# Patient Record
Sex: MALE | Race: WHITE | NOT HISPANIC OR LATINO | URBAN - METROPOLITAN AREA
[De-identification: names, ages, dates, MRNs, and addresses within clinical notes are randomized per-mention and may not be internally consistent; named-entity substitution may affect disease eponyms.]

---

## 2022-11-17 ENCOUNTER — TELEPHONE (OUTPATIENT)
Dept: ENDOCRINOLOGY | Facility: CLINIC | Age: 52
End: 2022-11-17
Payer: COMMERCIAL

## 2022-11-17 NOTE — TELEPHONE ENCOUNTER
Called patient to inform him  of his appointment on Jan 30 and also to inform him that he will have to go to his PCP or Urgent to get him some meds due to the fact we can't give medication to new Patients

## 2022-11-17 NOTE — TELEPHONE ENCOUNTER
----- Message from Rona Peterson MA sent at 11/17/2022  3:52 PM CST -----  Regarding: RE: New Patient  Contact: Patient  Patient would have to contact PCP or go to urgent care we can not give meds to new patients and they are on the waiting list to get in sooner   ----- Message -----  From: Astrid Anderson MA  Sent: 11/17/2022   3:45 PM CST  To: Rona Peterson MA  Subject: RE: New Patient                                  Patient agreed to  this appointment , but patient is out of meds . How do I go about him a refill   ----- Message -----  From: Rona Peterson MA  Sent: 11/17/2022   3:25 PM CST  To: Astrid Anderson MA  Subject: RE: New Patient                                  The only apt is at the [facility] patient is scheduled just let them know thanks   ----- Message -----  From: Astrid Anderson MA  Sent: 11/17/2022  11:41 AM CST  To: Rona Peterson MA  Subject: FW: New Patient                                  Please Help !  ----- Message -----  From: Ame Bates  Sent: 11/17/2022  11:21 AM CST  To: , #  Subject: New Patient                                      Patient just moved to [facility] from [place]  Patient was previously diagnosed with thyroid cancer and would like to be seen by a physician as soon as possible   Patient stated he has about 30 days worth of medication left and would like to be seen prior to needing a refill   Attempted to schedule no appts generated   Patient does have Medical Insurance was unable to add to chart  Please Assist     [employer] insurance Member ID [id number]    Patient can be reached at [phone]

## 2023-01-30 ENCOUNTER — OFFICE VISIT (OUTPATIENT)
Dept: ENDOCRINOLOGY | Facility: CLINIC | Age: 53
End: 2023-01-30
Payer: COMMERCIAL

## 2023-01-30 VITALS
TEMPERATURE: 98 F | HEART RATE: 65 BPM | DIASTOLIC BLOOD PRESSURE: 86 MMHG | SYSTOLIC BLOOD PRESSURE: 132 MMHG | WEIGHT: 180 LBS

## 2023-01-30 DIAGNOSIS — Z85.850 HX OF PAPILLARY THYROID CARCINOMA: ICD-10-CM

## 2023-01-30 DIAGNOSIS — E89.0 POSTOPERATIVE HYPOTHYROIDISM: Primary | ICD-10-CM

## 2023-01-30 PROCEDURE — 99204 PR OFFICE/OUTPT VISIT, NEW, LEVL IV, 45-59 MIN: ICD-10-PCS | Mod: S$GLB,,, | Performed by: HOSPITALIST

## 2023-01-30 PROCEDURE — 99999 PR PBB SHADOW E&M-EST. PATIENT-LVL III: CPT | Mod: PBBFAC,,, | Performed by: HOSPITALIST

## 2023-01-30 PROCEDURE — 99204 OFFICE O/P NEW MOD 45 MIN: CPT | Mod: S$GLB,,, | Performed by: HOSPITALIST

## 2023-01-30 PROCEDURE — 99999 PR PBB SHADOW E&M-EST. PATIENT-LVL III: ICD-10-PCS | Mod: PBBFAC,,, | Performed by: HOSPITALIST

## 2023-01-30 RX ORDER — LEVOTHYROXINE SODIUM 75 UG/1
75 TABLET ORAL EVERY MORNING
COMMUNITY
Start: 2023-01-06 | End: 2023-01-30

## 2023-01-30 RX ORDER — PROPRANOLOL HYDROCHLORIDE 40 MG/1
40 TABLET ORAL 2 TIMES DAILY
COMMUNITY
Start: 2023-01-06 | End: 2023-11-09

## 2023-01-30 RX ORDER — LEVOTHYROXINE SODIUM 100 UG/1
100 TABLET ORAL EVERY MORNING
COMMUNITY
Start: 2023-01-06 | End: 2023-01-30

## 2023-01-30 RX ORDER — LEVOTHYROXINE SODIUM 175 UG/1
175 TABLET ORAL
Qty: 90 TABLET | Refills: 3 | Status: SHIPPED | OUTPATIENT
Start: 2023-01-30 | End: 2023-03-22

## 2023-01-30 RX ORDER — LORAZEPAM 0.5 MG/1
0.5 TABLET ORAL 2 TIMES DAILY PRN
COMMUNITY
Start: 2022-11-22

## 2023-01-30 NOTE — ASSESSMENT & PLAN NOTE
- Pt with history of hypothyroidism due to post-surgical   - Pathophysiology of hypothyroidism, the role of TSH, free T4 were explained to patient  - TSH and free T4 lab work trend reviewed with patient in clinic and discussed  - previous outside lab work showed suppressed TSH, suspect due to too much LT4  - dose was adjusted by ENT:  We will continue levothyroxine 175 mcg daily  - Discuss and confirm with patient proper way of taking LT4: on an empty stomach with water and to wait 30-45 minutes before eating or taking other medications   - plan to Repeat TSH/FT4 in 6-8 weeks to re-evaluate thyroid function and make further adjustment  - Follow up: 6 months  - given thyroid cancer history: TSH goal less than 2.0

## 2023-01-30 NOTE — ASSESSMENT & PLAN NOTE
- patient reports, unfortunately no pathology for review  - per patient's diagnosis of Papillary Thyroid carcinoma  - status post LOPEZ 2021  - we will wait 6 week and check postoperative thyroglobulin level as well as TFTs  - will get ultrasound thyroid bed for evaluation  - if TG currently undetectable and this will be our marker for recurrence   - TSH goals discussed with patient. Goal is low TSH <2

## 2023-01-30 NOTE — PROGRESS NOTES
Subjective:      Patient ID: Kwadwo Forrest is a 52 y.o. male presented to [facility] on 1/30/2023.  Chief Complaint:  Thyroid Problem      History of Present Illness: Kwadwo Forrest is a 52 y.o. male here for hypothyroidism     Other significant past medical history:   Patient just moved to area from [place]    Here for evaluation of hypothyroidism postsurgical.  - Report hx of Papillary Thyroid Carcinoma, tall cell variant   - Has not seeing endocrinologist since 7/2021, post operative LOPEZ (dose unclear) around 8/2021  - Saw ENT in [place]  that has been management it  - Unknown thyroid cancer type, did have 1 parathyroid gland removed  - Previously had thyroid nodules, that was seen after MRI of Neck  - Family history thyroid disorder: yes, mom side of the family, 4 members with hypothyroidism    - Family history of thyroid cancer: yes, maternal uncle with thyroid cancer  - Tobacco user: no  - Saw ENT at [facility]  - 11/2022 TSH <0.008    Current medication: Levothyroxine 175mcg daily  - LT4 200mcg was too much, leading to symptoms  - Takes thyroid medication properly without food first thing in the morning, yes  - Some skin sensitivity     Current symptoms:   No   Yes  []    [x]   Weight gain  []    [x]   Fatigue  []    [x]   Constipation  []    [x]   Hair loss  [x]    []   Brittle nails  [x]    []   Mental fog  []    [x]   Cold intolerance  []    [x]   Anxiety  []    []   Bradycardia    No results found for: TSH, FREET4, THYROPEROXID     Reviewed past surgical, medical, family, social history and updated as appropriate.  Review of Systems: see HPI above    Objective:   /86   Pulse 65   Temp 98.2 °F (36.8 °C)   Wt 81.6 kg (180 lb)   There is no height or weight on file to calculate BMI.  Vital signs reviewed    Physical Exam  Vitals and nursing note reviewed.   Constitutional:       Appearance: Normal appearance. He is well-developed. He is not ill-appearing.   Neck:      Thyroid: No  thyromegaly.      Comments: Healed thyroidectomy scar  Pulmonary:      Effort: Pulmonary effort is normal. No respiratory distress.   Musculoskeletal:         General: Normal range of motion.      Cervical back: Normal range of motion.   Neurological:      General: No focal deficit present.      Mental Status: He is alert. Mental status is at baseline.   Psychiatric:         Mood and Affect: Mood normal.         Behavior: Behavior normal.       Lab Reviewed:  See results in subjective  No results found for: HGBA1C  No results found for: CHOL, HDL, LDLCALC, TRIG, CHOLHDL  No results found for: NA, K, CL, CO2, GLU, BUN, CREATININE, CALCIUM, PHOS, PROT, ALBUMIN, BILITOT, ALKPHOS, AST, ALT, ANIONGAP, ESTGFRAFRICA, EGFRNONAA, TSH, PTH, [id number]    Assessment     1. Postoperative hypothyroidism  levothyroxine (SYNTHROID, LEVOTHROID) 175 MCG tablet    TSH    T4, Free    T3    Comprehensive Metabolic Panel    Vitamin D      2. Hx of papillary thyroid carcinoma  Thyroglobulin    US Soft Tissue Head Neck Thyroid    Comprehensive Metabolic Panel    Vitamin D         Plan     Postoperative hypothyroidism  - Pt with history of hypothyroidism due to post-surgical   - Pathophysiology of hypothyroidism, the role of TSH, free T4 were explained to patient  - TSH and free T4 lab work trend reviewed with patient in clinic and discussed  - previous outside lab work showed suppressed TSH, suspect due to too much LT4  - dose was adjusted by ENT:  We will continue levothyroxine 175 mcg daily  - Discuss and confirm with patient proper way of taking LT4: on an empty stomach with water and to wait 30-45 minutes before eating or taking other medications   - plan to Repeat TSH/FT4 in 6-8 weeks to re-evaluate thyroid function and make further adjustment  - Follow up: 6 months  - given thyroid cancer history: TSH goal less than 2.0      Hx of papillary thyroid carcinoma  - patient reports, unfortunately no pathology for review  - per  patient's diagnosis of Papillary Thyroid carcinoma  - status post LOPEZ 2021  - we will wait 6 week and check postoperative thyroglobulin level as well as TFTs  - will get ultrasound thyroid bed for evaluation  - if TG currently undetectable and this will be our marker for recurrence   - TSH goals discussed with patient. Goal is low TSH <2       Advised patient to follow up with PCP for routine health maintenance care.   RTC in 6 months      Mike Redmond M.D.  Endocrinology  [facility]  1/30/2023      Disclaimer: This note has been generated in part with the use of voice-recognition software. There may be typographical errors that have been missed during proof-reading.

## 2023-02-02 ENCOUNTER — HOSPITAL ENCOUNTER (OUTPATIENT)
Dept: RADIOLOGY | Facility: HOSPITAL | Age: 53
Discharge: HOME | End: 2023-02-02
Attending: HOSPITALIST
Payer: COMMERCIAL

## 2023-02-02 DIAGNOSIS — Z85.850 HX OF PAPILLARY THYROID CARCINOMA: ICD-10-CM

## 2023-02-02 PROCEDURE — 76536 US EXAM OF HEAD AND NECK: CPT | Mod: 26,,, | Performed by: RADIOLOGY

## 2023-02-02 PROCEDURE — 76536 US SOFT TISSUE HEAD NECK THYROID: ICD-10-PCS | Mod: 26,,, | Performed by: RADIOLOGY

## 2023-02-02 PROCEDURE — 76536 US EXAM OF HEAD AND NECK: CPT | Mod: TC

## 2023-03-13 ENCOUNTER — LAB VISIT (OUTPATIENT)
Dept: LAB | Facility: HOSPITAL | Age: 53
End: 2023-03-13
Attending: HOSPITALIST
Payer: COMMERCIAL

## 2023-03-13 DIAGNOSIS — Z85.850 HX OF PAPILLARY THYROID CARCINOMA: ICD-10-CM

## 2023-03-13 DIAGNOSIS — E89.0 POSTOPERATIVE HYPOTHYROIDISM: ICD-10-CM

## 2023-03-13 LAB
25(OH)D3+25(OH)D2 SERPL-MCNC: 16 NG/ML (ref 30–96)
ALBUMIN SERPL BCP-MCNC: 3.8 G/DL (ref 3.5–5.2)
ALP SERPL-CCNC: 73 U/L (ref 55–135)
ALT SERPL W/O P-5'-P-CCNC: 17 U/L (ref 10–44)
ANION GAP SERPL CALC-SCNC: 9 MMOL/L (ref 8–16)
AST SERPL-CCNC: 18 U/L (ref 10–40)
BILIRUB SERPL-MCNC: 0.4 MG/DL (ref 0.1–1)
BUN SERPL-MCNC: 14 MG/DL (ref 6–20)
CALCIUM SERPL-MCNC: 8.9 MG/DL (ref 8.7–10.5)
CHLORIDE SERPL-SCNC: 106 MMOL/L (ref 95–110)
CO2 SERPL-SCNC: 28 MMOL/L (ref 23–29)
CREAT SERPL-MCNC: 0.9 MG/DL (ref 0.5–1.4)
EST. GFR  (NO RACE VARIABLE): >60 ML/MIN/1.73 M^2
GLUCOSE SERPL-MCNC: 99 MG/DL (ref 70–110)
POTASSIUM SERPL-SCNC: 4.1 MMOL/L (ref 3.5–5.1)
PROT SERPL-MCNC: 6.6 G/DL (ref 6–8.4)
SODIUM SERPL-SCNC: 143 MMOL/L (ref 136–145)
T3 SERPL-MCNC: 84 NG/DL (ref 60–180)
T4 FREE SERPL-MCNC: 1.18 NG/DL (ref 0.71–1.51)
TSH SERPL DL<=0.005 MIU/L-ACNC: 0.02 UIU/ML (ref 0.4–4)

## 2023-03-13 PROCEDURE — 36415 COLL VENOUS BLD VENIPUNCTURE: CPT | Performed by: HOSPITALIST

## 2023-03-13 PROCEDURE — 84439 ASSAY OF FREE THYROXINE: CPT | Performed by: HOSPITALIST

## 2023-03-13 PROCEDURE — 84443 ASSAY THYROID STIM HORMONE: CPT | Performed by: HOSPITALIST

## 2023-03-13 PROCEDURE — 82306 VITAMIN D 25 HYDROXY: CPT | Performed by: HOSPITALIST

## 2023-03-13 PROCEDURE — 84432 ASSAY OF THYROGLOBULIN: CPT | Performed by: HOSPITALIST

## 2023-03-13 PROCEDURE — 84480 ASSAY TRIIODOTHYRONINE (T3): CPT | Performed by: HOSPITALIST

## 2023-03-13 PROCEDURE — 80053 COMPREHEN METABOLIC PANEL: CPT | Performed by: HOSPITALIST

## 2023-03-14 LAB
THRYOGLOBULIN INTERPRETATION: ABNORMAL
THYROGLOB AB SERPL-ACNC: 1.9 IU/ML
THYROGLOB SERPL-MCNC: <0.1 NG/ML

## 2023-03-22 ENCOUNTER — TELEPHONE (OUTPATIENT)
Dept: ENDOCRINOLOGY | Facility: CLINIC | Age: 53
End: 2023-03-22
Payer: COMMERCIAL

## 2023-03-22 DIAGNOSIS — E89.0 POSTOPERATIVE HYPOTHYROIDISM: ICD-10-CM

## 2023-03-22 RX ORDER — LEVOTHYROXINE SODIUM 137 UG/1
137 TABLET ORAL
Qty: 90 TABLET | Refills: 3 | Status: SHIPPED | OUTPATIENT
Start: 2023-03-22 | End: 2023-11-28 | Stop reason: SDUPTHER

## 2023-03-22 NOTE — TELEPHONE ENCOUNTER
TSH suppressed, too much thyroid medication, decrease dose to 137 mcg daily  Thyroglobulin undetectable, mild positive antibody screen  Thyroid ultrasound negative for remnant thyroid tissue.  Good news

## 2023-03-24 NOTE — TELEPHONE ENCOUNTER
Spoke to patient, test results given, pt verbalized understanding. Recall set up for 6 month follow up

## 2023-07-24 ENCOUNTER — OFFICE VISIT (OUTPATIENT)
Dept: URGENT CARE | Facility: CLINIC | Age: 53
End: 2023-07-24
Payer: COMMERCIAL

## 2023-07-24 VITALS
TEMPERATURE: 98 F | RESPIRATION RATE: 17 BRPM | DIASTOLIC BLOOD PRESSURE: 93 MMHG | OXYGEN SATURATION: 96 % | SYSTOLIC BLOOD PRESSURE: 141 MMHG | WEIGHT: 184 LBS | HEART RATE: 67 BPM | BODY MASS INDEX: 24.92 KG/M2 | HEIGHT: 72 IN

## 2023-07-24 DIAGNOSIS — H60.313 ACUTE DIFFUSE OTITIS EXTERNA OF BOTH EARS: ICD-10-CM

## 2023-07-24 DIAGNOSIS — H66.93 BILATERAL ACUTE OTITIS MEDIA: Primary | ICD-10-CM

## 2023-07-24 PROCEDURE — 99203 OFFICE O/P NEW LOW 30 MIN: CPT | Mod: S$GLB,,, | Performed by: NURSE PRACTITIONER

## 2023-07-24 PROCEDURE — 99203 PR OFFICE/OUTPT VISIT, NEW, LEVL III, 30-44 MIN: ICD-10-PCS | Mod: S$GLB,,, | Performed by: NURSE PRACTITIONER

## 2023-07-24 RX ORDER — PREDNISONE 50 MG/1
50 TABLET ORAL DAILY
Qty: 5 TABLET | Refills: 0 | Status: SHIPPED | OUTPATIENT
Start: 2023-07-24 | End: 2023-07-29

## 2023-07-24 RX ORDER — AMOXICILLIN AND CLAVULANATE POTASSIUM 875; 125 MG/1; MG/1
1 TABLET, FILM COATED ORAL EVERY 12 HOURS
Qty: 20 TABLET | Refills: 0 | Status: SHIPPED | OUTPATIENT
Start: 2023-07-24 | End: 2023-08-03

## 2023-07-24 RX ORDER — NEOMYCIN SULFATE, POLYMYXIN B SULFATE AND HYDROCORTISONE 10; 3.5; 1 MG/ML; MG/ML; [USP'U]/ML
4 SUSPENSION/ DROPS AURICULAR (OTIC) 3 TIMES DAILY
Qty: 10 ML | Refills: 0 | Status: SHIPPED | OUTPATIENT
Start: 2023-07-24 | End: 2023-11-28

## 2023-07-24 NOTE — PROGRESS NOTES
Subjective:      Patient ID: Kwadwo Forrest is a 52 y.o. male.    Vitals:  height is 6' (1.829 m) and weight is 83.5 kg (184 lb). His oral temperature is 98 °F (36.7 °C). His blood pressure is 141/93 (abnormal) and his pulse is 67. His respiration is 17 and oxygen saturation is 96%.     Chief Complaint: Otalgia    52 y.o male presents today c/o bilateral ear pain x3 weeks, possible ear infection. Has upcoming flight he is concerned about.   Patient has been taking Ibuprofen with mild relief.     Otalgia   There is pain in both ears. This is a new problem. The current episode started 1 to 4 weeks ago. There has been no fever. The pain is at a severity of 6/10. Associated symptoms include headaches and hearing loss. Pertinent negatives include no abdominal pain, coughing, diarrhea, ear discharge, neck pain, rash, rhinorrhea, sore throat or vomiting. He has tried NSAIDs for the symptoms. The treatment provided no relief.     HENT:  Positive for ear pain and hearing loss. Negative for ear discharge and sore throat.    Neck: Negative for neck pain.   Respiratory:  Negative for cough.    Gastrointestinal:  Negative for abdominal pain, vomiting and diarrhea.   Skin:  Negative for rash.   Neurological:  Positive for headaches.    Objective:     Physical Exam   Constitutional: He is oriented to person, place, and time. He appears well-developed. He is cooperative.  Non-toxic appearance. He does not appear ill. No distress.   HENT:   Head: Normocephalic.   Ears:   Right Ear: Hearing normal. There is swelling and tenderness. Tympanic membrane is bulging.   Left Ear: Hearing normal. There is swelling and tenderness. Tympanic membrane is bulging.      Comments: Scant purulent discharge in left ear canal  Nose: Nose normal. No mucosal edema, rhinorrhea or nasal deformity. No epistaxis. Right sinus exhibits no maxillary sinus tenderness and no frontal sinus tenderness. Left sinus exhibits no maxillary sinus tenderness and no frontal  sinus tenderness.   Mouth/Throat: Uvula is midline, oropharynx is clear and moist and mucous membranes are normal. Mucous membranes are moist. No trismus in the jaw. Normal dentition. No uvula swelling. No oropharyngeal exudate, posterior oropharyngeal edema or posterior oropharyngeal erythema. Oropharynx is clear.   Eyes: Lids are normal. No scleral icterus.   Neck: Trachea normal and phonation normal. Neck supple. No edema present. No erythema present. No neck rigidity present.   Cardiovascular: Normal rate.   Pulmonary/Chest: Effort normal. No respiratory distress. He has no decreased breath sounds. He has no rhonchi.   Abdominal: Normal appearance.   Musculoskeletal: Normal range of motion.         General: No deformity. Normal range of motion.   Neurological: He is alert and oriented to person, place, and time. He exhibits normal muscle tone. Coordination normal.   Skin: Skin is warm, dry, intact, not diaphoretic and not pale.   Psychiatric: His speech is normal and behavior is normal. Judgment and thought content normal.   Nursing note and vitals reviewed.    Assessment:     1. Bilateral acute otitis media    2. Acute diffuse otitis externa of both ears        Plan:       Bilateral acute otitis media  -     amoxicillin-clavulanate 875-125mg (AUGMENTIN) 875-125 mg per tablet; Take 1 tablet by mouth every 12 (twelve) hours. for 10 days  Dispense: 20 tablet; Refill: 0    Acute diffuse otitis externa of both ears  -     neomycin-polymyxin-hydrocortisone (CORTISPORIN) 3.5-10,000-1 mg/mL-unit/mL-% otic suspension; Place 4 drops into both ears 3 (three) times daily.  Dispense: 10 mL; Refill: 0  -     predniSONE (DELTASONE) 50 MG Tab; Take 1 tablet (50 mg total) by mouth once daily. for 5 days  Dispense: 5 tablet; Refill: 0

## 2023-07-25 ENCOUNTER — PATIENT MESSAGE (OUTPATIENT)
Dept: INTERNAL MEDICINE | Facility: CLINIC | Age: 53
End: 2023-07-25
Payer: COMMERCIAL

## 2023-11-09 ENCOUNTER — OFFICE VISIT (OUTPATIENT)
Dept: ENDOCRINOLOGY | Facility: CLINIC | Age: 53
End: 2023-11-09
Payer: COMMERCIAL

## 2023-11-09 ENCOUNTER — LAB VISIT (OUTPATIENT)
Dept: LAB | Facility: HOSPITAL | Age: 53
End: 2023-11-09
Attending: HOSPITALIST
Payer: COMMERCIAL

## 2023-11-09 VITALS
TEMPERATURE: 98 F | HEART RATE: 65 BPM | SYSTOLIC BLOOD PRESSURE: 128 MMHG | DIASTOLIC BLOOD PRESSURE: 87 MMHG | WEIGHT: 195.19 LBS | BODY MASS INDEX: 26.47 KG/M2

## 2023-11-09 DIAGNOSIS — E55.9 VITAMIN D DEFICIENCY: ICD-10-CM

## 2023-11-09 DIAGNOSIS — R63.5 WEIGHT GAIN: ICD-10-CM

## 2023-11-09 DIAGNOSIS — Z85.850 HX OF PAPILLARY THYROID CARCINOMA: ICD-10-CM

## 2023-11-09 DIAGNOSIS — E89.0 POSTOPERATIVE HYPOTHYROIDISM: ICD-10-CM

## 2023-11-09 DIAGNOSIS — E89.0 POSTOPERATIVE HYPOTHYROIDISM: Primary | ICD-10-CM

## 2023-11-09 LAB
ALBUMIN SERPL BCP-MCNC: 4 G/DL (ref 3.5–5.2)
ANION GAP SERPL CALC-SCNC: 8 MMOL/L (ref 8–16)
BUN SERPL-MCNC: 15 MG/DL (ref 6–20)
CALCIUM SERPL-MCNC: 9.3 MG/DL (ref 8.7–10.5)
CHLORIDE SERPL-SCNC: 107 MMOL/L (ref 95–110)
CO2 SERPL-SCNC: 26 MMOL/L (ref 23–29)
CREAT SERPL-MCNC: 1.1 MG/DL (ref 0.5–1.4)
EST. GFR  (NO RACE VARIABLE): >60 ML/MIN/1.73 M^2
ESTIMATED AVG GLUCOSE: 103 MG/DL (ref 68–131)
GLUCOSE SERPL-MCNC: 100 MG/DL (ref 70–110)
HBA1C MFR BLD: 5.2 % (ref 4–5.6)
PHOSPHATE SERPL-MCNC: 2.5 MG/DL (ref 2.7–4.5)
POTASSIUM SERPL-SCNC: 4.7 MMOL/L (ref 3.5–5.1)
SODIUM SERPL-SCNC: 141 MMOL/L (ref 136–145)
T3 SERPL-MCNC: 82 NG/DL (ref 60–180)
T4 FREE SERPL-MCNC: 1.17 NG/DL (ref 0.71–1.51)
TSH SERPL DL<=0.005 MIU/L-ACNC: 0.21 UIU/ML (ref 0.4–4)

## 2023-11-09 PROCEDURE — 99214 PR OFFICE/OUTPT VISIT, EST, LEVL IV, 30-39 MIN: ICD-10-PCS | Mod: S$GLB,,, | Performed by: HOSPITALIST

## 2023-11-09 PROCEDURE — 1159F PR MEDICATION LIST DOCUMENTED IN MEDICAL RECORD: ICD-10-PCS | Mod: CPTII,S$GLB,, | Performed by: HOSPITALIST

## 2023-11-09 PROCEDURE — 3074F SYST BP LT 130 MM HG: CPT | Mod: CPTII,S$GLB,, | Performed by: HOSPITALIST

## 2023-11-09 PROCEDURE — 3079F PR MOST RECENT DIASTOLIC BLOOD PRESSURE 80-89 MM HG: ICD-10-PCS | Mod: CPTII,S$GLB,, | Performed by: HOSPITALIST

## 2023-11-09 PROCEDURE — 1159F MED LIST DOCD IN RCRD: CPT | Mod: CPTII,S$GLB,, | Performed by: HOSPITALIST

## 2023-11-09 PROCEDURE — 80069 RENAL FUNCTION PANEL: CPT | Performed by: HOSPITALIST

## 2023-11-09 PROCEDURE — 3079F DIAST BP 80-89 MM HG: CPT | Mod: CPTII,S$GLB,, | Performed by: HOSPITALIST

## 2023-11-09 PROCEDURE — 84443 ASSAY THYROID STIM HORMONE: CPT | Performed by: HOSPITALIST

## 2023-11-09 PROCEDURE — 3008F BODY MASS INDEX DOCD: CPT | Mod: CPTII,S$GLB,, | Performed by: HOSPITALIST

## 2023-11-09 PROCEDURE — 3074F PR MOST RECENT SYSTOLIC BLOOD PRESSURE < 130 MM HG: ICD-10-PCS | Mod: CPTII,S$GLB,, | Performed by: HOSPITALIST

## 2023-11-09 PROCEDURE — 84480 ASSAY TRIIODOTHYRONINE (T3): CPT | Performed by: HOSPITALIST

## 2023-11-09 PROCEDURE — 99999 PR PBB SHADOW E&M-EST. PATIENT-LVL III: ICD-10-PCS | Mod: PBBFAC,,, | Performed by: HOSPITALIST

## 2023-11-09 PROCEDURE — 83036 HEMOGLOBIN GLYCOSYLATED A1C: CPT | Performed by: HOSPITALIST

## 2023-11-09 PROCEDURE — 99999 PR PBB SHADOW E&M-EST. PATIENT-LVL III: CPT | Mod: PBBFAC,,, | Performed by: HOSPITALIST

## 2023-11-09 PROCEDURE — 99214 OFFICE O/P EST MOD 30 MIN: CPT | Mod: S$GLB,,, | Performed by: HOSPITALIST

## 2023-11-09 PROCEDURE — 84439 ASSAY OF FREE THYROXINE: CPT | Performed by: HOSPITALIST

## 2023-11-09 PROCEDURE — 3008F PR BODY MASS INDEX (BMI) DOCUMENTED: ICD-10-PCS | Mod: CPTII,S$GLB,, | Performed by: HOSPITALIST

## 2023-11-09 PROCEDURE — 36415 COLL VENOUS BLD VENIPUNCTURE: CPT | Performed by: HOSPITALIST

## 2023-11-09 NOTE — PROGRESS NOTES
Subjective:      Patient ID: Kwadwo Forrest is a 53 y.o. male presented to [facility] on 11/9/2023.  Chief Complaint:  Hypothyroidism    History of Present Illness: Kwadwo Forrest is a 53 y.o. male here for postsurgical hypothyroidism  No other significant past medical history  Patient just moved to area from [place]    Interval history:  Patient is here for follow-up hypothyroidism postsurgical.    Doing well. Recent ear infection  Weight gain 195<< previous 180  Recent vacation  Not watching diet  Compliant with levothyroxine 137 mcg daily      1) Postsurgical hypothyroidism  - Report hx of Papillary Thyroid Carcinoma, tall cell variant   - Has not seeing endocrinologist since 7/2021, post operative LOPEZ (dose unclear) around 8/2021  - Saw ENT in [place]  that has been management it  - Unknown thyroid cancer type, did have 1 parathyroid gland removed  - Previously had thyroid nodules, that was seen after MRI of Neck  - Family history thyroid disorder: yes, mom side of the family, 4 members with hypothyroidism    - Family history of thyroid cancer: yes, maternal uncle with thyroid cancer  - Tobacco user: no  - Saw ENT at [facility]  - 11/2022 TSH <0.008, TSH 0.017, levothyroxine dose was decreased to 137 mcg 3/2023    - Current medication: Levothyroxine 135mcg daily  - LT4 200mcg was too much, leading to symptoms  - Takes thyroid medication properly without food first thing in the morning, yes  - Some skin sensitivity     Current symptoms:   No   Yes  []    [x]   Weight gain  []    [x]   Fatigue  []    [x]   Constipation  []    [x]   Hair loss  [x]    []   Brittle nails  [x]    []   Mental fog  []    [x]   Cold intolerance  []    [x]   Anxiety  []    []   Bradycardia    Lab Results   Component Value Date    TSH 0.212 (L) 11/09/2023    TSH 0.017 (L) 03/13/2023    FREET4 1.17 11/09/2023    FREET4 1.18 03/13/2023     Lab work reviewed  Lab Results   Component Value Date    THYGLBTUM <0.1 03/13/2023      "  US Neck 02/02/2023  There is no sonographically visualized thyroid parenchyma.   Normal appearing lymph nodes bilaterally.  No suspicious lymph nodes.     Impression:  In this patient with history of papillary thyroid cancer status post radioablation per chart review, no evidence of residual thyroid tissue.  No suspicious lymph nodes.       2) Vit D deficiency  - Noted on lab work  - not on supplement    Lab Results   Component Value Date    [id number] 16 (L) 03/13/2023       Reviewed past surgical, medical, family, social history and updated as appropriate.  Review of Systems: see HPI above    Objective:   /87   Pulse 65   Temp 98 °F (36.7 °C) (Oral)   Wt 88.5 kg (195 lb 3.2 oz)   BMI 26.47 kg/m²   Body mass index is 26.47 kg/m².  Vital signs reviewed    Physical Exam  Vitals and nursing note reviewed.   Constitutional:       Appearance: Normal appearance. He is well-developed. He is not ill-appearing.   Neck:      Thyroid: No thyromegaly.      Comments: Healed thyroidectomy scar  Pulmonary:      Effort: Pulmonary effort is normal. No respiratory distress.   Musculoskeletal:         General: Normal range of motion.      Cervical back: Normal range of motion.   Neurological:      General: No focal deficit present.      Mental Status: He is alert. Mental status is at baseline.   Psychiatric:         Mood and Affect: Mood normal.         Behavior: Behavior normal.       Lab Reviewed:  See results in subjective  No results found for: "HGBA1C"  No results found for: "CHOL", "HDL", "LDLCALC", "TRIG", "CHOLHDL"  Lab Results   Component Value Date     11/09/2023    K 4.7 11/09/2023     11/09/2023    CO2 26 11/09/2023     11/09/2023    BUN 15 11/09/2023    CREATININE 1.1 11/09/2023    CALCIUM 9.3 11/09/2023    PHOS 2.5 (L) 11/09/2023    PROT 6.6 03/13/2023    ALBUMIN 4.0 11/09/2023    BILITOT 0.4 03/13/2023    ALKPHOS 73 03/13/2023    AST 18 03/13/2023    ALT 17 03/13/2023    ANIONGAP 8 " 11/09/2023    TSH 0.212 (L) 11/09/2023    [id number] 16 (L) 03/13/2023     Assessment     1. Postoperative hypothyroidism  TSH    T4, Free    T3    TSH    T4, Free    Thyroglobulin      2. Hx of papillary thyroid carcinoma  Thyroglobulin      3. Weight gain  HEMOGLOBIN A1C    RENAL FUNCTION PANEL      4. Vitamin D deficiency  Vitamin D        Plan     Postoperative hypothyroidism  - Pt with history of hypothyroidism due to post-surgical   - Pathophysiology of hypothyroidism, the role of TSH, free T4 were explained to patient  - TSH and free T4 lab work trend reviewed with patient in clinic and discussed  - Previous outside lab work showed suppressed TSH, suspect due to too much LT4  - Dose was decrease levothyroxine 137 mcg daily.  03/2023  - Discuss and confirm with patient proper way of taking LT4: on an empty stomach with water and to wait 30-45 minutes before eating or taking other medications   - Plan to Repeat TSH/FT4 soon, given weight gain and symptoms.  Pending results will make further adjustment.    - Otherwise follow-up with me every 6 months weeks to re-evaluate thyroid function and make further adjustment  - given thyroid cancer history: TSH goal less than 2.0      Hx of papillary thyroid carcinoma  - patient reports, unfortunately no pathology for review  - per patient's diagnosis of Papillary Thyroid carcinoma  - status post LOPEZ 2021  - ultrasound thyroid bed:  2023: No sign of remnant tissue.  Lymph nodes are normal on evaluation  - TG is undetectable, reassurance given.  - TSH goals discussed with patient. Goal is low TSH <2  - monitor TG yearly.  No need for ultrasound unless TG elevation    Vitamin D deficiency  - advised patient to start vitamin-D 3 OTC 2000 IU daily    Advised patient to follow up with PCP for routine health maintenance care.   RTC in 6 months    Mike Redmond M.D.  Endocrinology  [facility]  11/9/2023      Disclaimer: This note has been generated  in part with the use of voice-recognition software. There may be typographical errors that have been missed during proof-reading.

## 2023-11-09 NOTE — ASSESSMENT & PLAN NOTE
- Pt with history of hypothyroidism due to post-surgical   - Pathophysiology of hypothyroidism, the role of TSH, free T4 were explained to patient  - TSH and free T4 lab work trend reviewed with patient in clinic and discussed  - Previous outside lab work showed suppressed TSH, suspect due to too much LT4  - Dose was decrease levothyroxine 137 mcg daily.  03/2023  - Discuss and confirm with patient proper way of taking LT4: on an empty stomach with water and to wait 30-45 minutes before eating or taking other medications   - Plan to Repeat TSH/FT4 soon, given weight gain and symptoms.  Pending results will make further adjustment.    - Otherwise follow-up with me every 6 months weeks to re-evaluate thyroid function and make further adjustment  - given thyroid cancer history: TSH goal less than 2.0

## 2023-11-09 NOTE — ASSESSMENT & PLAN NOTE
- patient reports, unfortunately no pathology for review  - per patient's diagnosis of Papillary Thyroid carcinoma  - status post LOPEZ 2021  - ultrasound thyroid bed:  2023: No sign of remnant tissue.  Lymph nodes are normal on evaluation  - TG is undetectable, reassurance given.  - TSH goals discussed with patient. Goal is low TSH <2  - monitor TG yearly.  No need for ultrasound unless TG elevation

## 2023-11-28 RX ORDER — LEVOTHYROXINE SODIUM 137 UG/1
137 TABLET ORAL
Qty: 90 TABLET | Refills: 3 | Status: SHIPPED | OUTPATIENT
Start: 2023-11-28 | End: 2024-11-27

## 2024-05-09 ENCOUNTER — LAB VISIT (OUTPATIENT)
Dept: LAB | Facility: HOSPITAL | Age: 54
End: 2024-05-09
Attending: HOSPITALIST
Payer: COMMERCIAL

## 2024-05-09 ENCOUNTER — OFFICE VISIT (OUTPATIENT)
Dept: ENDOCRINOLOGY | Facility: CLINIC | Age: 54
End: 2024-05-09
Payer: COMMERCIAL

## 2024-05-09 VITALS
HEART RATE: 77 BPM | WEIGHT: 196.38 LBS | SYSTOLIC BLOOD PRESSURE: 126 MMHG | DIASTOLIC BLOOD PRESSURE: 84 MMHG | BODY MASS INDEX: 26.64 KG/M2

## 2024-05-09 DIAGNOSIS — E55.9 VITAMIN D DEFICIENCY: ICD-10-CM

## 2024-05-09 DIAGNOSIS — Z85.850 HX OF PAPILLARY THYROID CARCINOMA: ICD-10-CM

## 2024-05-09 DIAGNOSIS — E89.0 POSTOPERATIVE HYPOTHYROIDISM: ICD-10-CM

## 2024-05-09 DIAGNOSIS — E89.0 POSTOPERATIVE HYPOTHYROIDISM: Primary | ICD-10-CM

## 2024-05-09 LAB
25(OH)D3+25(OH)D2 SERPL-MCNC: 18 NG/ML (ref 30–96)
ALBUMIN SERPL BCP-MCNC: 4.1 G/DL (ref 3.5–5.2)
ANION GAP SERPL CALC-SCNC: 11 MMOL/L (ref 8–16)
BUN SERPL-MCNC: 11 MG/DL (ref 6–20)
CALCIUM SERPL-MCNC: 9.4 MG/DL (ref 8.7–10.5)
CHLORIDE SERPL-SCNC: 106 MMOL/L (ref 95–110)
CO2 SERPL-SCNC: 23 MMOL/L (ref 23–29)
CREAT SERPL-MCNC: 1.1 MG/DL (ref 0.5–1.4)
EST. GFR  (NO RACE VARIABLE): >60 ML/MIN/1.73 M^2
GLUCOSE SERPL-MCNC: 104 MG/DL (ref 70–110)
PHOSPHATE SERPL-MCNC: 2.4 MG/DL (ref 2.7–4.5)
POTASSIUM SERPL-SCNC: 4.2 MMOL/L (ref 3.5–5.1)
SODIUM SERPL-SCNC: 140 MMOL/L (ref 136–145)
T4 FREE SERPL-MCNC: 1.18 NG/DL (ref 0.71–1.51)
TSH SERPL DL<=0.005 MIU/L-ACNC: 0.58 UIU/ML (ref 0.4–4)

## 2024-05-09 PROCEDURE — 3008F BODY MASS INDEX DOCD: CPT | Mod: CPTII,S$GLB,, | Performed by: HOSPITALIST

## 2024-05-09 PROCEDURE — 99999 PR PBB SHADOW E&M-EST. PATIENT-LVL III: CPT | Mod: PBBFAC,,, | Performed by: HOSPITALIST

## 2024-05-09 PROCEDURE — 3079F DIAST BP 80-89 MM HG: CPT | Mod: CPTII,S$GLB,, | Performed by: HOSPITALIST

## 2024-05-09 PROCEDURE — 80069 RENAL FUNCTION PANEL: CPT | Performed by: HOSPITALIST

## 2024-05-09 PROCEDURE — 84443 ASSAY THYROID STIM HORMONE: CPT | Performed by: HOSPITALIST

## 2024-05-09 PROCEDURE — 99214 OFFICE O/P EST MOD 30 MIN: CPT | Mod: S$GLB,,, | Performed by: HOSPITALIST

## 2024-05-09 PROCEDURE — 1160F RVW MEDS BY RX/DR IN RCRD: CPT | Mod: CPTII,S$GLB,, | Performed by: HOSPITALIST

## 2024-05-09 PROCEDURE — 1159F MED LIST DOCD IN RCRD: CPT | Mod: CPTII,S$GLB,, | Performed by: HOSPITALIST

## 2024-05-09 PROCEDURE — 82306 VITAMIN D 25 HYDROXY: CPT | Performed by: HOSPITALIST

## 2024-05-09 PROCEDURE — 84432 ASSAY OF THYROGLOBULIN: CPT | Performed by: HOSPITALIST

## 2024-05-09 PROCEDURE — 3074F SYST BP LT 130 MM HG: CPT | Mod: CPTII,S$GLB,, | Performed by: HOSPITALIST

## 2024-05-09 PROCEDURE — 84439 ASSAY OF FREE THYROXINE: CPT | Performed by: HOSPITALIST

## 2024-05-09 RX ORDER — LEVOTHYROXINE SODIUM 137 UG/1
137 TABLET ORAL
Qty: 90 TABLET | Refills: 3 | Status: SHIPPED | OUTPATIENT
Start: 2024-05-09 | End: 2025-05-09

## 2024-05-09 NOTE — PROGRESS NOTES
Subjective:      Patient ID: Kwadwo Forrest is a 53 y.o. male presented to [facility] on 5/9/2024.  Chief Complaint:  Hypothyroidism    History of Present Illness: Kwadwo Forrest is a 53 y.o. male here for postsurgical hypothyroidism  No other significant past medical history  Patient just moved to area from [place]    Interval history:  Patient is here for follow-up hypothyroidism postsurgical.  Doing well.  Reports weight gain  Current in clinic weight 195<< previous 180.  Reports that he needs to watch his diet.    Compliant with Levothyroxine 137 mcg daily  On vitamin-D 3 2000 IU, taking it 4 times a week      1) Postsurgical hypothyroidism  - Report hx of Papillary Thyroid Carcinoma, tall cell variant   - Has not seeing endocrinologist since 7/2021, post operative LOPEZ (dose unclear) around 8/2021  - Saw ENT in [place]  that has been management it  - Unknown thyroid cancer type, did have 1 parathyroid gland removed  - Previously had thyroid nodules, that was seen after MRI of Neck  - Family history thyroid disorder: yes, mom side of the family, 4 members with hypothyroidism    - Family history of thyroid cancer: yes, maternal uncle with thyroid cancer  - Tobacco user: no  - Saw ENT at [facility]  - 11/2022 TSH <0.008, TSH 0.017, levothyroxine dose was decreased to 137 mcg 3/2023    - Current medication: Levothyroxine 135mcg daily  - LT4 200mcg was too much, leading to symptoms  - Takes thyroid medication properly without food first thing in the morning, yes  - Some skin sensitivity     Current symptoms:   No   Yes  []    [x]   Weight gain>> 180-196  []    [x]   Fatigue  [x]    []   Constipation  [x]    []   Hair loss  [x]    []   Brittle nails  [x]    []   Mental fog  []    [x]   Cold intolerance  []    [x]   Anxiety  []    []   Bradycardia    Lab Results   Component Value Date    TSH 0.585 05/09/2024    TSH 0.212 (L) 11/09/2023    TSH 0.017 (L) 03/13/2023    FREET4 1.18 05/09/2024    FREET4  "1.17 11/09/2023    FREET4 1.18 03/13/2023     Lab work reviewed  Lab Results   Component Value Date    THYGLBTUM <0.1 03/13/2023       US Neck 02/02/2023  There is no sonographically visualized thyroid parenchyma.   Normal appearing lymph nodes bilaterally.  No suspicious lymph nodes.     Impression:  In this patient with history of papillary thyroid cancer status post radioablation per chart review, no evidence of residual thyroid tissue.  No suspicious lymph nodes.       2) Vit D deficiency  - Noted on lab work  - not on supplement    Lab Results   Component Value Date    [id number] 16 (L) 03/13/2023       Reviewed past surgical, medical, family, social history and updated as appropriate.  Review of Systems: see HPI above    Objective:   /84   Pulse 77   Wt 89.1 kg (196 lb 6.4 oz)   BMI 26.64 kg/m²   Body mass index is 26.64 kg/m².  Vital signs reviewed    Physical Exam  Vitals and nursing note reviewed.   Constitutional:       Appearance: Normal appearance. He is well-developed. He is not ill-appearing.   Neck:      Thyroid: No thyromegaly.      Comments: Healed thyroidectomy scar  Pulmonary:      Effort: Pulmonary effort is normal. No respiratory distress.   Musculoskeletal:         General: Normal range of motion.      Cervical back: Normal range of motion.   Neurological:      General: No focal deficit present.      Mental Status: He is alert. Mental status is at baseline.   Psychiatric:         Mood and Affect: Mood normal.         Behavior: Behavior normal.       Lab Reviewed:  See results in subjective  Lab Results   Component Value Date    HGBA1C 5.2 11/09/2023     No results found for: "CHOL", "HDL", "LDLCALC", "TRIG", "CHOLHDL"  Lab Results   Component Value Date     05/09/2024    K 4.2 05/09/2024     05/09/2024    CO2 23 05/09/2024     05/09/2024    BUN 11 05/09/2024    CREATININE 1.1 05/09/2024    CALCIUM 9.4 05/09/2024    PHOS 2.4 (L) 05/09/2024    PROT 6.6 03/13/2023    " ALBUMIN 4.1 05/09/2024    BILITOT 0.4 03/13/2023    ALKPHOS 73 03/13/2023    AST 18 03/13/2023    ALT 17 03/13/2023    ANIONGAP 11 05/09/2024    TSH 0.585 05/09/2024    [id number] 16 (L) 03/13/2023     Assessment     1. Postoperative hypothyroidism  RENAL FUNCTION PANEL    levothyroxine (SYNTHROID) 137 MCG Tab tablet      2. Hx of papillary thyroid carcinoma        3. Vitamin D deficiency            Plan     Postoperative hypothyroidism  - Pt with history of hypothyroidism due to post-surgical   - Pathophysiology of hypothyroidism, the role of TSH, free T4 were explained to patient  - TSH and free T4 lab work trend reviewed with patient in clinic and discussed  - Previous outside lab work showed suppressed TSH, suspect due to too much LT4  - Most recent TSH checked 5/9/2024.  Continue levothyroxine 137 mcg daily>> refill sent 5/9/2024  - Discuss and confirm with patient proper way of taking LT4: on an empty stomach with water and to wait 30-45 minutes before eating or taking other medications   - Otherwise follow-up with me every 6 months weeks to re-evaluate thyroid function and make further adjustment  - given thyroid cancer history: TSH goal less than 2.0      Hx of papillary thyroid carcinoma  - patient reports, unfortunately no pathology for review  - per patient's diagnosis of Papillary Thyroid carcinoma  - status post LOPEZ 2021  - ultrasound thyroid bed:  2023: No sign of remnant tissue.  Lymph nodes are normal on evaluation  - TG is undetectable, reassurance given.  - TSH goals discussed with patient. Goal is low TSH <2  - monitor TG yearly.  No need for ultrasound unless TG elevation  - check TG for 2024    Vitamin D deficiency  - advised patient to start vitamin-D 3 OTC 2000 IU daily  - check lab work 2024      Advised patient to follow up with PCP for routine health maintenance care.   RTC in 6 months    Mike Redmond M.D.  Endocrinology  [facility]  5/9/2024      Disclaimer:  This note has been generated in part with the use of voice-recognition software. There may be typographical errors that have been missed during proof-reading.

## 2024-05-09 NOTE — ASSESSMENT & PLAN NOTE
- patient reports, unfortunately no pathology for review  - per patient's diagnosis of Papillary Thyroid carcinoma  - status post LOPEZ 2021  - ultrasound thyroid bed:  2023: No sign of remnant tissue.  Lymph nodes are normal on evaluation  - TG is undetectable, reassurance given.  - TSH goals discussed with patient. Goal is low TSH <2  - monitor TG yearly.  No need for ultrasound unless TG elevation  - check TG for 2024

## 2024-05-09 NOTE — ASSESSMENT & PLAN NOTE
- Pt with history of hypothyroidism due to post-surgical   - Pathophysiology of hypothyroidism, the role of TSH, free T4 were explained to patient  - TSH and free T4 lab work trend reviewed with patient in clinic and discussed  - Previous outside lab work showed suppressed TSH, suspect due to too much LT4  - Most recent TSH checked 5/9/2024.  Continue levothyroxine 137 mcg daily>> refill sent 5/9/2024  - Discuss and confirm with patient proper way of taking LT4: on an empty stomach with water and to wait 30-45 minutes before eating or taking other medications   - Otherwise follow-up with me every 6 months weeks to re-evaluate thyroid function and make further adjustment  - given thyroid cancer history: TSH goal less than 2.0

## 2024-05-10 LAB
THRYOGLOBULIN INTERPRETATION: NORMAL
THYROGLOB AB SERPL-ACNC: <1.8 IU/ML
THYROGLOB SERPL-MCNC: <0.1 NG/ML

## 2024-11-18 ENCOUNTER — OFFICE VISIT (OUTPATIENT)
Dept: ENDOCRINOLOGY | Facility: CLINIC | Age: 54
End: 2024-11-18
Payer: COMMERCIAL

## 2024-11-18 ENCOUNTER — LAB VISIT (OUTPATIENT)
Dept: LAB | Facility: HOSPITAL | Age: 54
End: 2024-11-18
Attending: HOSPITALIST
Payer: COMMERCIAL

## 2024-11-18 VITALS
DIASTOLIC BLOOD PRESSURE: 76 MMHG | BODY MASS INDEX: 26.37 KG/M2 | HEART RATE: 69 BPM | WEIGHT: 194.38 LBS | SYSTOLIC BLOOD PRESSURE: 110 MMHG

## 2024-11-18 DIAGNOSIS — E55.9 VITAMIN D DEFICIENCY: ICD-10-CM

## 2024-11-18 DIAGNOSIS — E89.0 POSTOPERATIVE HYPOTHYROIDISM: ICD-10-CM

## 2024-11-18 DIAGNOSIS — E89.0 POSTOPERATIVE HYPOTHYROIDISM: Primary | ICD-10-CM

## 2024-11-18 DIAGNOSIS — Z85.850 HX OF PAPILLARY THYROID CARCINOMA: ICD-10-CM

## 2024-11-18 LAB
25(OH)D3+25(OH)D2 SERPL-MCNC: 29 NG/ML (ref 30–96)
T4 FREE SERPL-MCNC: 1.31 NG/DL (ref 0.71–1.51)
TSH SERPL DL<=0.005 MIU/L-ACNC: 0.08 UIU/ML (ref 0.4–4)

## 2024-11-18 PROCEDURE — 1160F RVW MEDS BY RX/DR IN RCRD: CPT | Mod: CPTII,S$GLB,, | Performed by: HOSPITALIST

## 2024-11-18 PROCEDURE — 36415 COLL VENOUS BLD VENIPUNCTURE: CPT | Performed by: HOSPITALIST

## 2024-11-18 PROCEDURE — 99214 OFFICE O/P EST MOD 30 MIN: CPT | Mod: S$GLB,,, | Performed by: HOSPITALIST

## 2024-11-18 PROCEDURE — 3074F SYST BP LT 130 MM HG: CPT | Mod: CPTII,S$GLB,, | Performed by: HOSPITALIST

## 2024-11-18 PROCEDURE — 84439 ASSAY OF FREE THYROXINE: CPT | Performed by: HOSPITALIST

## 2024-11-18 PROCEDURE — 82306 VITAMIN D 25 HYDROXY: CPT | Performed by: HOSPITALIST

## 2024-11-18 PROCEDURE — 3078F DIAST BP <80 MM HG: CPT | Mod: CPTII,S$GLB,, | Performed by: HOSPITALIST

## 2024-11-18 PROCEDURE — 1159F MED LIST DOCD IN RCRD: CPT | Mod: CPTII,S$GLB,, | Performed by: HOSPITALIST

## 2024-11-18 PROCEDURE — 3008F BODY MASS INDEX DOCD: CPT | Mod: CPTII,S$GLB,, | Performed by: HOSPITALIST

## 2024-11-18 PROCEDURE — 99999 PR PBB SHADOW E&M-EST. PATIENT-LVL III: CPT | Mod: PBBFAC,,, | Performed by: HOSPITALIST

## 2024-11-18 PROCEDURE — 84443 ASSAY THYROID STIM HORMONE: CPT | Performed by: HOSPITALIST

## 2024-11-18 RX ORDER — LEVOTHYROXINE SODIUM 137 UG/1
137 TABLET ORAL
Qty: 90 TABLET | Refills: 3 | Status: SHIPPED | OUTPATIENT
Start: 2024-11-18 | End: 2025-11-18

## 2024-11-18 NOTE — PROGRESS NOTES
Subjective:      Patient ID: Kwadwo Forrest is a 54 y.o. male presented to [facility] on 11/18/2024.  Chief Complaint:  Hypothyroidism    History of Present Illness: Kwadwo Forrest is a 54 y.o. male here for postsurgical hypothyroidism  No other significant past medical history  Patient moved to area from [place] 1/2023    Interval history:  Patient is here for follow-up hypothyroidism postsurgical.  Doing well.  Reports weight gain  Current in clinic weight 194<<previous 195<< 180.  Reports that he needs to watch his diet.    Compliant with Levothyroxine 137 mcg daily  On vitamin-D 3 2000 IU daily      1) Postsurgical hypothyroidism  - Report hx of Papillary Thyroid Carcinoma, tall cell variant   - Has not seeing endocrinologist since 7/2021, post operative LOPEZ (dose unclear) around 8/2021  - Saw ENT in [place]  that has been management it  - Unknown thyroid cancer type, did have 1 parathyroid gland removed  - Previously had thyroid nodules, that was seen after MRI of Neck  - Family history thyroid disorder: yes, mom side of the family, 4 members with hypothyroidism    - Saw ENT at [facility]  - 11/2022 TSH <0.008, TSH 0.017, levothyroxine dose was decreased to 137 mcg 3/2023  -  LT4 200mcg was too much, leading to symptoms  - Family history of thyroid cancer: yes, maternal uncle with thyroid cancer  - Tobacco user: no    - Current medication: Levothyroxine 135mcg daily  - Takes thyroid medication properly without food first thing in the morning, yes    Current symptoms:   No   Yes  []    [x]   Weight gain>> 180-196  []    [x]   Fatigue  [x]    []   Constipation  [x]    []   Hair loss  [x]    []   Brittle nails  [x]    []   Mental fog  []    [x]   Cold intolerance  []    [x]   Anxiety  []    []   Bradycardia    Lab Results   Component Value Date    TSH 0.083 (L) 11/18/2024    TSH 0.585 05/09/2024    TSH 0.212 (L) 11/09/2023    FREET4 1.31 11/18/2024    FREET4 1.18 05/09/2024    FREET4 1.17  "11/09/2023     Lab work reviewed  Lab Results   Component Value Date    THYGLBTUM <0.1 05/09/2024    THYGLBTUM <0.1 03/13/2023       US Neck 02/02/2023  There is no sonographically visualized thyroid parenchyma.   Normal appearing lymph nodes bilaterally.  No suspicious lymph nodes.     Impression:  In this patient with history of papillary thyroid cancer status post radioablation per chart review, no evidence of residual thyroid tissue.  No suspicious lymph nodes.       2) Vit D deficiency  - Noted on lab work  - 05/2024: Recommended OTC vitamin-D 2000 IU daily    Lab Results   Component Value Date    [id number] 18 (L) 05/09/2024    [id number] 16 (L) 03/13/2023       Reviewed past surgical, medical, family, social history and updated as appropriate.  Review of Systems: see HPI above    Objective:   /76   Pulse 69   Wt 88.2 kg (194 lb 6.4 oz)   BMI 26.37 kg/m²   Body mass index is 26.37 kg/m².  Vital signs reviewed    Physical Exam  Vitals and nursing note reviewed.   Constitutional:       Appearance: Normal appearance. He is well-developed. He is not ill-appearing.   Neck:      Thyroid: No thyromegaly.      Comments: Healed thyroidectomy scar  Pulmonary:      Effort: Pulmonary effort is normal. No respiratory distress.   Musculoskeletal:         General: Normal range of motion.      Cervical back: Normal range of motion.   Neurological:      General: No focal deficit present.      Mental Status: He is alert. Mental status is at baseline.   Psychiatric:         Mood and Affect: Mood normal.         Behavior: Behavior normal.       Lab Reviewed:  See results in subjective  Lab Results   Component Value Date    HGBA1C 5.2 11/09/2023     No results found for: "CHOL", "HDL", "LDLCALC", "TRIG", "CHOLHDL"  Lab Results   Component Value Date     05/09/2024    K 4.2 05/09/2024     05/09/2024    CO2 23 05/09/2024     05/09/2024    BUN 11 05/09/2024    CREATININE 1.1 05/09/2024    CALCIUM 9.4 " 05/09/2024    PHOS 2.4 (L) 05/09/2024    PROT 6.6 03/13/2023    ALBUMIN 4.1 05/09/2024    BILITOT 0.4 03/13/2023    ALKPHOS 73 03/13/2023    AST 18 03/13/2023    ALT 17 03/13/2023    ANIONGAP 11 05/09/2024    TSH 0.083 (L) 11/18/2024    [id number] 18 (L) 05/09/2024     Assessment     1. Postoperative hypothyroidism  TSH    T4, Free    TSH    T4, Free    HEMOGLOBIN A1C    US Thyroid    levothyroxine (SYNTHROID) 137 MCG Tab tablet      2. Hx of papillary thyroid carcinoma  Thyroglobulin    US Thyroid      3. Vitamin D deficiency  Vitamin D        Plan     Postoperative hypothyroidism  - Pt with history of hypothyroidism due to post-surgical   - I reviewed lab work trends: TSH, Free T4, with patient in clinic today 11/18/2024   - Discussed and confirmed the proper way of taking levothyroxine: daily on an empty stomach, waiting 30 minutes before any other medication. The patient verbalized understanding.  - CONTINUE  Levothyroxine 137 mcg daily.  - Trend lab work TSH, FT4 every 6 months> follow-up with endocrinology to adjust medication  - If TSH/T4 in normal range, patient should continue refills with their PCP No, Primary Doctor, given chronic nature of hypothyroidism    - Given thyroid cancer history: TSH goal less than 2.0    Hx of papillary thyroid carcinoma  - Patient reports, unfortunately no pathology for review  - Per patient's diagnosis of Papillary Thyroid carcinoma, Status post LOPEZ 2021  - Ultrasound thyroid bed: 2023: No sign of remnant tissue.  Lymph nodes are normal on evaluation  - TG is undetectable, reassurance given.  - TSH goals discussed with patient. Goal is low TSH <2  - Monitor TG yearly.  So far 2023, 2024 has been negative will check for 2025  - Check ultrasound neck soon      Vitamin D deficiency  - advised patient to start vitamin-D 3 OTC 2000 IU daily  - check lab work 2024    Advised patient to follow up with PCP for routine health maintenance care.   RTC in 6 months    Mike Redmond,  M.D.  Endocrinology  [facility]  11/18/2024      Disclaimer: This note has been generated in part with the use of voice-recognition software. There may be typographical errors that have been missed during proof-reading.

## 2024-11-18 NOTE — ASSESSMENT & PLAN NOTE
- Patient reports, unfortunately no pathology for review  - Per patient's diagnosis of Papillary Thyroid carcinoma, Status post LOPEZ 2021  - Ultrasound thyroid bed: 2023: No sign of remnant tissue.  Lymph nodes are normal on evaluation  - TG is undetectable, reassurance given.  - TSH goals discussed with patient. Goal is low TSH <2  - Monitor TG yearly.  So far 2023, 2024 has been negative will check for 2025  - Check ultrasound neck soon

## 2024-11-18 NOTE — ASSESSMENT & PLAN NOTE
- Pt with history of hypothyroidism due to post-surgical   - I reviewed lab work trends: TSH, Free T4, with patient in clinic today 11/18/2024   - Discussed and confirmed the proper way of taking levothyroxine: daily on an empty stomach, waiting 30 minutes before any other medication. The patient verbalized understanding.  - CONTINUE  Levothyroxine 137 mcg daily.  - Trend lab work TSH, FT4 every 6 months> follow-up with endocrinology to adjust medication  - If TSH/T4 in normal range, patient should continue refills with their PCP No, Primary Doctor, given chronic nature of hypothyroidism    - Given thyroid cancer history: TSH goal less than 2.0

## 2024-11-22 ENCOUNTER — HOSPITAL ENCOUNTER (OUTPATIENT)
Dept: RADIOLOGY | Facility: HOSPITAL | Age: 54
Discharge: HOME | End: 2024-11-22
Attending: HOSPITALIST
Payer: COMMERCIAL

## 2024-11-22 DIAGNOSIS — E89.0 POSTOPERATIVE HYPOTHYROIDISM: ICD-10-CM

## 2024-11-22 DIAGNOSIS — Z85.850 HX OF PAPILLARY THYROID CARCINOMA: ICD-10-CM

## 2024-11-22 PROCEDURE — 76536 US EXAM OF HEAD AND NECK: CPT | Mod: 26,,, | Performed by: RADIOLOGY

## 2024-11-22 PROCEDURE — 76536 US EXAM OF HEAD AND NECK: CPT | Mod: TC

## 2024-12-11 ENCOUNTER — PATIENT MESSAGE (OUTPATIENT)
Dept: ENDOCRINOLOGY | Facility: CLINIC | Age: 54
End: 2024-12-11
Payer: COMMERCIAL

## 2025-03-18 ENCOUNTER — PATIENT MESSAGE (OUTPATIENT)
Dept: ENDOCRINOLOGY | Facility: CLINIC | Age: 55
End: 2025-03-18
Payer: COMMERCIAL

## 2025-03-25 ENCOUNTER — OFFICE VISIT (OUTPATIENT)
Dept: OPTOMETRY | Facility: CLINIC | Age: 55
End: 2025-03-25
Payer: COMMERCIAL

## 2025-03-25 DIAGNOSIS — H25.13 NUCLEAR SCLEROSIS OF BOTH EYES: Primary | ICD-10-CM

## 2025-03-25 PROCEDURE — 1159F MED LIST DOCD IN RCRD: CPT | Mod: CPTII,S$GLB,, | Performed by: OPTOMETRIST

## 2025-03-25 PROCEDURE — 92015 DETERMINE REFRACTIVE STATE: CPT | Mod: S$GLB,,, | Performed by: OPTOMETRIST

## 2025-03-25 PROCEDURE — 99999 PR PBB SHADOW E&M-EST. PATIENT-LVL II: CPT | Mod: PBBFAC,,, | Performed by: OPTOMETRIST

## 2025-03-25 PROCEDURE — 92004 COMPRE OPH EXAM NEW PT 1/>: CPT | Mod: S$GLB,,, | Performed by: OPTOMETRIST

## 2025-03-25 NOTE — PROGRESS NOTES
HPI    Pt is here today for routine eye exam. Denies pain/discomfort.  DLS: 5+ Years  (-)Flashes   (+)Floaters   (-)Diplopia   (+)Headaches   (+)Itching   (-)Tearing  (-)Burning  (+)Dryness   (+)Photophobia  (+)Glare   (+)Blurred VA  Past Eye Sx: (-)  Eye Meds: (-)   Last edited by Sita Saavedra, OD on 3/25/2025  8:45 AM.            Assessment /Plan     For exam results, see Encounter Report.    Nuclear sclerosis of both eyes      Educated pt on findings. Not visually significant. No need for removal at this time. Monitor yearly.      Eyeglass Final Rx       Eyeglass Final Rx         Sphere Cylinder Add    Right +1.00 Sphere +1.75    Left +0.50 +0.50 +1.75      Type: PAL    Expiration Date: 3/25/2026                  RTC in 1 year for annual eye exam unless changes noted sooner.

## 2025-04-01 ENCOUNTER — OFFICE VISIT (OUTPATIENT)
Dept: INTERNAL MEDICINE | Facility: CLINIC | Age: 55
End: 2025-04-01
Payer: COMMERCIAL

## 2025-04-01 ENCOUNTER — LAB VISIT (OUTPATIENT)
Dept: LAB | Facility: HOSPITAL | Age: 55
End: 2025-04-01
Payer: COMMERCIAL

## 2025-04-01 VITALS
HEIGHT: 72 IN | DIASTOLIC BLOOD PRESSURE: 81 MMHG | HEART RATE: 67 BPM | OXYGEN SATURATION: 97 % | BODY MASS INDEX: 25.92 KG/M2 | WEIGHT: 191.38 LBS | SYSTOLIC BLOOD PRESSURE: 122 MMHG

## 2025-04-01 DIAGNOSIS — R10.9 ABDOMINAL PAIN, UNSPECIFIED ABDOMINAL LOCATION: ICD-10-CM

## 2025-04-01 DIAGNOSIS — R31.9 HEMATURIA, UNSPECIFIED TYPE: ICD-10-CM

## 2025-04-01 DIAGNOSIS — R31.9 HEMATURIA, UNSPECIFIED TYPE: Primary | ICD-10-CM

## 2025-04-01 LAB
ABSOLUTE EOSINOPHIL (OHS): 0.11 K/UL
ABSOLUTE MONOCYTE (OHS): 0.4 K/UL (ref 0.3–1)
ABSOLUTE NEUTROPHIL COUNT (OHS): 4.44 K/UL (ref 1.8–7.7)
ALBUMIN SERPL BCP-MCNC: 4.1 G/DL (ref 3.5–5.2)
ALP SERPL-CCNC: 63 UNIT/L (ref 40–150)
ALT SERPL W/O P-5'-P-CCNC: 24 UNIT/L (ref 10–44)
ANION GAP (OHS): 10 MMOL/L (ref 8–16)
APTT PPP: 28.6 SECONDS (ref 21–32)
AST SERPL-CCNC: 20 UNIT/L (ref 11–45)
BASOPHILS # BLD AUTO: 0.06 K/UL
BASOPHILS NFR BLD AUTO: 1 %
BILIRUB SERPL-MCNC: 0.8 MG/DL (ref 0.1–1)
BILIRUB UR QL STRIP.AUTO: NEGATIVE
BUN SERPL-MCNC: 14 MG/DL (ref 6–20)
CALCIUM SERPL-MCNC: 8.7 MG/DL (ref 8.7–10.5)
CHLORIDE SERPL-SCNC: 104 MMOL/L (ref 95–110)
CLARITY UR: CLEAR
CO2 SERPL-SCNC: 27 MMOL/L (ref 23–29)
COLOR UR AUTO: ABNORMAL
CREAT SERPL-MCNC: 0.9 MG/DL (ref 0.5–1.4)
ERYTHROCYTE [DISTWIDTH] IN BLOOD BY AUTOMATED COUNT: 11.8 % (ref 11.5–14.5)
GFR SERPLBLD CREATININE-BSD FMLA CKD-EPI: >60 ML/MIN/1.73/M2
GLUCOSE SERPL-MCNC: 97 MG/DL (ref 70–110)
GLUCOSE UR QL STRIP: NEGATIVE
HCT VFR BLD AUTO: 46.4 % (ref 40–54)
HGB BLD-MCNC: 14.8 GM/DL (ref 14–18)
HGB UR QL STRIP: ABNORMAL
IMM GRANULOCYTES # BLD AUTO: 0.02 K/UL (ref 0–0.04)
IMM GRANULOCYTES NFR BLD AUTO: 0.3 % (ref 0–0.5)
INR PPP: 0.9 (ref 0.8–1.2)
KETONES UR QL STRIP: NEGATIVE
LEUKOCYTE ESTERASE UR QL STRIP: NEGATIVE
LYMPHOCYTES # BLD AUTO: 0.95 K/UL (ref 1–4.8)
MCH RBC QN AUTO: 30.1 PG (ref 27–31)
MCHC RBC AUTO-ENTMCNC: 31.9 G/DL (ref 32–36)
MCV RBC AUTO: 94 FL (ref 82–98)
NITRITE UR QL STRIP: NEGATIVE
NUCLEATED RBC (/100WBC) (OHS): 0 /100 WBC
PH UR STRIP: 7 [PH]
PLATELET # BLD AUTO: 197 K/UL (ref 150–450)
PMV BLD AUTO: 11.5 FL (ref 9.2–12.9)
POTASSIUM SERPL-SCNC: 3.9 MMOL/L (ref 3.5–5.1)
PROT SERPL-MCNC: 7.3 GM/DL (ref 6–8.4)
PROT UR QL STRIP: NEGATIVE
PROTHROMBIN TIME: 10.3 SECONDS (ref 9–12.5)
RBC # BLD AUTO: 4.92 M/UL (ref 4.6–6.2)
RELATIVE EOSINOPHIL (OHS): 1.8 %
RELATIVE LYMPHOCYTE (OHS): 15.9 % (ref 18–48)
RELATIVE MONOCYTE (OHS): 6.7 % (ref 4–15)
RELATIVE NEUTROPHIL (OHS): 74.3 % (ref 38–73)
SODIUM SERPL-SCNC: 141 MMOL/L (ref 136–145)
SP GR UR STRIP: 1
TSH SERPL-ACNC: 2.49 UIU/ML (ref 0.4–4)
UROBILINOGEN UR STRIP-ACNC: NEGATIVE EU/DL
WBC # BLD AUTO: 5.98 K/UL (ref 3.9–12.7)

## 2025-04-01 PROCEDURE — 36415 COLL VENOUS BLD VENIPUNCTURE: CPT

## 2025-04-01 PROCEDURE — 3008F BODY MASS INDEX DOCD: CPT | Mod: CPTII,S$GLB,, | Performed by: PHYSICIAN ASSISTANT

## 2025-04-01 PROCEDURE — 84443 ASSAY THYROID STIM HORMONE: CPT

## 2025-04-01 PROCEDURE — 99999 PR PBB SHADOW E&M-EST. PATIENT-LVL IV: CPT | Mod: PBBFAC,,, | Performed by: PHYSICIAN ASSISTANT

## 2025-04-01 PROCEDURE — 81002 URINALYSIS NONAUTO W/O SCOPE: CPT | Performed by: PHYSICIAN ASSISTANT

## 2025-04-01 PROCEDURE — 99214 OFFICE O/P EST MOD 30 MIN: CPT | Mod: S$GLB,,, | Performed by: PHYSICIAN ASSISTANT

## 2025-04-01 PROCEDURE — 3074F SYST BP LT 130 MM HG: CPT | Mod: CPTII,S$GLB,, | Performed by: PHYSICIAN ASSISTANT

## 2025-04-01 PROCEDURE — 85025 COMPLETE CBC W/AUTO DIFF WBC: CPT

## 2025-04-01 PROCEDURE — 87086 URINE CULTURE/COLONY COUNT: CPT | Performed by: PHYSICIAN ASSISTANT

## 2025-04-01 PROCEDURE — 85730 THROMBOPLASTIN TIME PARTIAL: CPT

## 2025-04-01 PROCEDURE — 85610 PROTHROMBIN TIME: CPT

## 2025-04-01 PROCEDURE — 82040 ASSAY OF SERUM ALBUMIN: CPT

## 2025-04-01 PROCEDURE — 3079F DIAST BP 80-89 MM HG: CPT | Mod: CPTII,S$GLB,, | Performed by: PHYSICIAN ASSISTANT

## 2025-04-01 PROCEDURE — 1159F MED LIST DOCD IN RCRD: CPT | Mod: CPTII,S$GLB,, | Performed by: PHYSICIAN ASSISTANT

## 2025-04-01 NOTE — PROGRESS NOTES
INTERNAL MEDICINE URGENT VISIT NOTE    CHIEF COMPLAINT     Chief Complaint   Patient presents with    Hematuria     Progressively got worse throughout the week - starting to become green    Flank Pain     bilateral       HPI     Kwadwo Forrest is a 54 y.o. male who presents for an urgent visit today.    PCP is No, Primary Doctor, patient is new to me.     Patient presents with complaints of red tinged urine and bilateral flank pain left greater than right that has been present for approximately 1 week prior to arrival.  Reports feeling feverish but denies nausea, vomiting, measured temperature.  He has not been taking any medications to help with symptoms.  Admits he has thyroid trouble and has been taking Synthroid since his thyroid was surgically removed because of cancer.  He is currently holding thyroid medication because he accidentally overdosed around Darryn Gras time.  He is well established with Endocrinology, Dr. Redmond.    He denies any chest pain, shortness of breath, palpitations.  He does endorse increased anxiety and stress.  He has no history of nephrolithiasis or UTIs.    He is unaccompanied here in the office.    Past Medical History:  No past medical history on file.    Home Medications:  Prior to Admission medications    Medication Sig Start Date End Date Taking? Authorizing Provider   levothyroxine (SYNTHROID) 137 MCG Tab tablet Take 1 tablet (137 mcg total) by mouth before breakfast. 11/18/24 11/18/25 Yes Mike Redmond MD   LORazepam (ATIVAN) 0.5 MG tablet Take 0.5 mg by mouth 2 (two) times daily as needed. 11/22/22  Yes Provider, Historical       Review of Systems:  Review of Systems   Constitutional:  Negative for chills and fever.   HENT:  Negative for sore throat and trouble swallowing.    Eyes:  Negative for visual disturbance.   Respiratory:  Negative for cough and shortness of breath.    Cardiovascular:  Negative for chest pain.   Gastrointestinal:  Negative for abdominal pain,  constipation, diarrhea, nausea and vomiting.   Genitourinary:  Positive for hematuria. Negative for dysuria and flank pain.   Musculoskeletal:  Negative for back pain, neck pain and neck stiffness.   Skin:  Negative for rash.   Neurological:  Negative for dizziness, syncope, weakness and headaches.   Psychiatric/Behavioral:  Negative for confusion.        Health Maintainence:   Immunizations:  Health Maintenance         Date Due Completion Date    Hepatitis C Screening Never done ---    Lipid Panel Never done ---    HIV Screening Never done ---    TETANUS VACCINE Never done ---    Colorectal Cancer Screening Never done ---    Shingles Vaccine (1 of 2) Never done ---    Pneumococcal Vaccines (Age 50+) (1 of 1 - PCV) Never done ---    Influenza Vaccine (1) Never done ---    COVID-19 Vaccine (1 - 2024-25 season) Never done ---    Hemoglobin A1c (Diabetic Prevention Screening) 11/09/2026 11/9/2023    RSV Vaccine (Age 60+ and Pregnant patients) (1 - 1-dose 75+ series) 08/20/2047 ---             PHYSICAL EXAM     /81 (BP Location: Left arm, Patient Position: Sitting)   Pulse 67   Ht 6' (1.829 m)   Wt 86.8 kg (191 lb 5.8 oz)   SpO2 97%   BMI 25.95 kg/m²     Physical Exam  Vitals and nursing note reviewed.   Constitutional:       Appearance: Normal appearance.      Comments: Healthy-appearing male in no acute distress or apparent pain.  He does seem anxious during interview and exam.  He makes good eye contact, speaks in clear full sentences and is cooperative.   HENT:      Head: Normocephalic and atraumatic.      Nose: Nose normal.      Mouth/Throat:      Pharynx: Oropharynx is clear.   Eyes:      Conjunctiva/sclera: Conjunctivae normal.   Cardiovascular:      Rate and Rhythm: Normal rate and regular rhythm.      Pulses: Normal pulses.   Pulmonary:      Effort: Pulmonary effort is normal. No respiratory distress.      Breath sounds: Normal breath sounds.   Abdominal:      Tenderness: There is no abdominal  tenderness.      Comments: Left-sided CVA tenderness to percussion without acute peritoneal signs.   Musculoskeletal:         General: Normal range of motion.      Cervical back: No rigidity.   Skin:     General: Skin is warm and dry.      Capillary Refill: Capillary refill takes less than 2 seconds.      Findings: No rash.   Neurological:      General: No focal deficit present.      Mental Status: He is alert.      Gait: Gait normal.   Psychiatric:         Mood and Affect: Mood normal.         LABS     Lab Results   Component Value Date    HGBA1C 5.2 11/09/2023     CMP  Sodium   Date Value Ref Range Status   05/09/2024 140 136 - 145 mmol/L Final     Potassium   Date Value Ref Range Status   05/09/2024 4.2 3.5 - 5.1 mmol/L Final     Chloride   Date Value Ref Range Status   05/09/2024 106 95 - 110 mmol/L Final     CO2   Date Value Ref Range Status   05/09/2024 23 23 - 29 mmol/L Final     Glucose   Date Value Ref Range Status   05/09/2024 104 70 - 110 mg/dL Final     BUN   Date Value Ref Range Status   05/09/2024 11 6 - 20 mg/dL Final     Creatinine   Date Value Ref Range Status   05/09/2024 1.1 0.5 - 1.4 mg/dL Final     Calcium   Date Value Ref Range Status   05/09/2024 9.4 8.7 - 10.5 mg/dL Final     Total Protein   Date Value Ref Range Status   03/13/2023 6.6 6.0 - 8.4 g/dL Final     Albumin   Date Value Ref Range Status   05/09/2024 4.1 3.5 - 5.2 g/dL Final     Total Bilirubin   Date Value Ref Range Status   03/13/2023 0.4 0.1 - 1.0 mg/dL Final     Comment:     For infants and newborns, interpretation of results should be based  on gestational age, weight and in agreement with clinical  observations.    Premature Infant recommended reference ranges:  Up to 24 hours.............<8.0 mg/dL  Up to 48 hours............<12.0 mg/dL  3-5 days..................<15.0 mg/dL  6-29 days.................<15.0 mg/dL       Alkaline Phosphatase   Date Value Ref Range Status   03/13/2023 73 55 - 135 U/L Final     AST   Date Value  "Ref Range Status   03/13/2023 18 10 - 40 U/L Final     ALT   Date Value Ref Range Status   03/13/2023 17 10 - 44 U/L Final     Anion Gap   Date Value Ref Range Status   05/09/2024 11 8 - 16 mmol/L Final     No results found for: "WBC", "HGB", "HCT", "MCV", "PLT"  No results found for: "CHOL"  No results found for: "HDL"  No results found for: "LDLCALC"  No results found for: "TRIG"  No results found for: "CHOLHDL"  Lab Results   Component Value Date    TSH 0.083 (L) 11/18/2024    [id number] 82 11/09/2023       ASSESSMENT/PLAN     Kwadwo Forrest is a 54 y.o. male     Kwadwo was seen today for hematuria and flank pain.  Concern for nephrolithiasis.  Will check urine, CT stone study and labs.  Patient is aware of ED prompts.    Diagnoses and all orders for this visit:    Hematuria, unspecified type  -     CT Renal Stone Study ABD Pelvis WO; Future  -     Comprehensive Metabolic Panel; Future  -     CBC Auto Differential; Future  -     APTT; Future  -     Protime-INR; Future  -     Urinalysis  -     Urine culture  -     TSH; Future    Abdominal pain, unspecified abdominal location  -     CT Renal Stone Study ABD Pelvis WO; Future  -     Comprehensive Metabolic Panel; Future  -     CBC Auto Differential; Future  -     APTT; Future  -     Protime-INR; Future  -     Urinalysis  -     Urine culture  -     TSH; Future    Patient was counseled on when and how to seek emergent care.       Joyce Tomlin PA-C   Department of Internal Medicine - [facility]   1:59 PM     "

## 2025-04-02 ENCOUNTER — RESULTS FOLLOW-UP (OUTPATIENT)
Dept: INTERNAL MEDICINE | Facility: CLINIC | Age: 55
End: 2025-04-02

## 2025-04-02 LAB — BACTERIA UR CULT: NO GROWTH

## 2025-04-04 ENCOUNTER — HOSPITAL ENCOUNTER (OUTPATIENT)
Dept: RADIOLOGY | Facility: HOSPITAL | Age: 55
Discharge: HOME | End: 2025-04-04
Attending: PHYSICIAN ASSISTANT
Payer: COMMERCIAL

## 2025-04-04 DIAGNOSIS — R31.9 HEMATURIA, UNSPECIFIED TYPE: ICD-10-CM

## 2025-04-04 DIAGNOSIS — R10.9 ABDOMINAL PAIN, UNSPECIFIED ABDOMINAL LOCATION: ICD-10-CM

## 2025-04-04 PROCEDURE — 74176 CT ABD & PELVIS W/O CONTRAST: CPT | Mod: TC

## 2025-04-04 PROCEDURE — 74176 CT ABD & PELVIS W/O CONTRAST: CPT | Mod: 26,,, | Performed by: RADIOLOGY

## 2025-04-15 ENCOUNTER — OFFICE VISIT (OUTPATIENT)
Dept: INTERNAL MEDICINE | Facility: CLINIC | Age: 55
End: 2025-04-15
Payer: COMMERCIAL

## 2025-04-15 VITALS
DIASTOLIC BLOOD PRESSURE: 76 MMHG | BODY MASS INDEX: 25.35 KG/M2 | OXYGEN SATURATION: 99 % | WEIGHT: 186.94 LBS | SYSTOLIC BLOOD PRESSURE: 124 MMHG | HEART RATE: 72 BPM

## 2025-04-15 DIAGNOSIS — Z12.11 COLON CANCER SCREENING: ICD-10-CM

## 2025-04-15 DIAGNOSIS — Z85.850 HX OF PAPILLARY THYROID CARCINOMA: ICD-10-CM

## 2025-04-15 DIAGNOSIS — R31.9 HEMATURIA, UNSPECIFIED TYPE: Primary | ICD-10-CM

## 2025-04-15 DIAGNOSIS — Z11.59 ENCOUNTER FOR HEPATITIS C SCREENING TEST FOR LOW RISK PATIENT: ICD-10-CM

## 2025-04-15 DIAGNOSIS — Z13.220 LIPID SCREENING: ICD-10-CM

## 2025-04-15 DIAGNOSIS — Z86.11 HISTORY OF TB (TUBERCULOSIS): ICD-10-CM

## 2025-04-15 DIAGNOSIS — Z11.4 SCREENING FOR HIV WITHOUT PRESENCE OF RISK FACTORS: ICD-10-CM

## 2025-04-15 DIAGNOSIS — Z23 NEED FOR VACCINATION: ICD-10-CM

## 2025-04-15 DIAGNOSIS — E89.0 POSTOPERATIVE HYPOTHYROIDISM: ICD-10-CM

## 2025-04-15 DIAGNOSIS — F41.9 ANXIETY DISORDER WITH PANIC ATTACKS: ICD-10-CM

## 2025-04-15 PROBLEM — E03.9 HYPOTHYROIDISM: Status: ACTIVE | Noted: 2025-03-06

## 2025-04-15 PROCEDURE — 90715 TDAP VACCINE 7 YRS/> IM: CPT | Mod: S$GLB,,, | Performed by: STUDENT IN AN ORGANIZED HEALTH CARE EDUCATION/TRAINING PROGRAM

## 2025-04-15 PROCEDURE — 99999 PR PBB SHADOW E&M-EST. PATIENT-LVL IV: CPT | Mod: PBBFAC,,, | Performed by: STUDENT IN AN ORGANIZED HEALTH CARE EDUCATION/TRAINING PROGRAM

## 2025-04-15 PROCEDURE — 99215 OFFICE O/P EST HI 40 MIN: CPT | Mod: 25,S$GLB,, | Performed by: STUDENT IN AN ORGANIZED HEALTH CARE EDUCATION/TRAINING PROGRAM

## 2025-04-15 PROCEDURE — 3078F DIAST BP <80 MM HG: CPT | Mod: CPTII,S$GLB,, | Performed by: STUDENT IN AN ORGANIZED HEALTH CARE EDUCATION/TRAINING PROGRAM

## 2025-04-15 PROCEDURE — 1159F MED LIST DOCD IN RCRD: CPT | Mod: CPTII,S$GLB,, | Performed by: STUDENT IN AN ORGANIZED HEALTH CARE EDUCATION/TRAINING PROGRAM

## 2025-04-15 PROCEDURE — 90471 IMMUNIZATION ADMIN: CPT | Mod: S$GLB,,, | Performed by: STUDENT IN AN ORGANIZED HEALTH CARE EDUCATION/TRAINING PROGRAM

## 2025-04-15 PROCEDURE — 3074F SYST BP LT 130 MM HG: CPT | Mod: CPTII,S$GLB,, | Performed by: STUDENT IN AN ORGANIZED HEALTH CARE EDUCATION/TRAINING PROGRAM

## 2025-04-15 PROCEDURE — 1160F RVW MEDS BY RX/DR IN RCRD: CPT | Mod: CPTII,S$GLB,, | Performed by: STUDENT IN AN ORGANIZED HEALTH CARE EDUCATION/TRAINING PROGRAM

## 2025-04-15 PROCEDURE — 3008F BODY MASS INDEX DOCD: CPT | Mod: CPTII,S$GLB,, | Performed by: STUDENT IN AN ORGANIZED HEALTH CARE EDUCATION/TRAINING PROGRAM

## 2025-04-15 PROCEDURE — G2211 COMPLEX E/M VISIT ADD ON: HCPCS | Mod: S$GLB,,, | Performed by: STUDENT IN AN ORGANIZED HEALTH CARE EDUCATION/TRAINING PROGRAM

## 2025-04-15 RX ORDER — LORAZEPAM 0.5 MG/1
0.5 TABLET ORAL 2 TIMES DAILY PRN
Qty: 10 TABLET | Refills: 0 | Status: SHIPPED | OUTPATIENT
Start: 2025-04-15 | End: 2025-04-25

## 2025-04-15 NOTE — ASSESSMENT & PLAN NOTE
Patient reports papillary thyroid cancer treated with resection + radiation 2021, no records available   Following with Dr. Redmond in Endocrinology   TSH goal <2.0 given cancer history - recently 2.49  Currently on levothyroxine 137 mcg

## 2025-04-15 NOTE — PROGRESS NOTES
[facility] ESTABLISH CARE VISIT      CHIEF COMPLAINT:   Chief Complaint   Patient presents with    Establish Care       HISTORY OF PRESENT ILLNESS: Kwadwo Forrest is a 54 y.o. male who presents here today to establish care.     He recently saw Joyce HADDAD here at [facility] for hematuria - CT without renal stone and UA otherwise unremarkable at that time with exception of trace blood. Hematuria has since persisted. Seems to be triggered by physical activity. But sometimes no hematuria despite physical activity. Urine ranges in color from red/pink, charan colored, sometimes almost green tint. Denies dysuria, reports some lower abdomen and flank pain difficult to discern exact location.     He has a history of hypothyroidism due to thyroid resection for treatment of thyroid cancer. Currently on levothyroxine 137 mcg. Following with Endocrinology.     Health maintenance - due for several vaccines, labs, colon cancer screening      REVIEW OF SYSTEMS:    ROS as in HPI.       PAST MEDICAL HISTORY:  Past Medical History:   Diagnosis Date    Anxiety disorder with panic attacks 04/15/2025    History of TB (tuberculosis) 04/15/2025    Hx of papillary thyroid carcinoma 01/30/2023    Postoperative hypothyroidism 01/30/2023         MEDICATIONS:    Current Outpatient Medications:     levothyroxine (SYNTHROID) 137 MCG Tab tablet, Take 1 tablet (137 mcg total) by mouth before breakfast., Disp: 90 tablet, Rfl: 3    LORazepam (ATIVAN) 0.5 MG tablet, Take 0.5 mg by mouth 2 (two) times daily as needed. (Patient not taking: Reported on 4/15/2025), Disp: , Rfl:         PAST SURGICAL HISTORY:  Past Surgical History:   Procedure Laterality Date    APPENDECTOMY  2011    HERNIA REPAIR  2004    THYROIDECTOMY  2021       SOCIAL HISTORY:  Social History     Tobacco Use    Smoking status: Former     Current packs/day: 0.00     Average packs/day: 0.5 packs/day for 15.0 years (7.5 ttl pk-yrs)     Types: Cigarettes     Quit date: 2019      Years since quittin.2    Smokeless tobacco: Never   Substance Use Topics    Alcohol use: Yes     Alcohol/week: 6.0 standard drinks of alcohol     Types: 6 Cans of beer per week    Drug use: Yes     Frequency: 7.0 times per week     Types: Marijuana       ALLERGIES:  Review of patient's allergies indicates:  No Known Allergies      FAMILY HISTORY:  No family history on file.      PHYSICAL EXAM:    /76 (BP Location: Left arm, Patient Position: Sitting)   Pulse 72   Wt 84.8 kg (186 lb 15.2 oz)   SpO2 99%   BMI 25.35 kg/m²     Physical Exam  Vitals and nursing note reviewed.   Constitutional:       General: He is not in acute distress.     Appearance: Normal appearance. He is not ill-appearing, toxic-appearing or diaphoretic.   HENT:      Head: Normocephalic and atraumatic.      Right Ear: Tympanic membrane, ear canal and external ear normal.      Left Ear: Tympanic membrane, ear canal and external ear normal.      Nose: Nose normal.      Mouth/Throat:      Mouth: Mucous membranes are moist.      Pharynx: Oropharynx is clear. No oropharyngeal exudate.   Eyes:      Extraocular Movements: Extraocular movements intact.      Conjunctiva/sclera: Conjunctivae normal.      Pupils: Pupils are equal, round, and reactive to light.   Cardiovascular:      Rate and Rhythm: Normal rate and regular rhythm.      Heart sounds: Normal heart sounds. No murmur heard.  Pulmonary:      Effort: Pulmonary effort is normal. No respiratory distress.      Breath sounds: Normal breath sounds. No wheezing.   Musculoskeletal:         General: No deformity. Normal range of motion.      Cervical back: Neck supple. No tenderness.   Lymphadenopathy:      Cervical: No cervical adenopathy.   Skin:     Findings: No lesion or rash.   Neurological:      General: No focal deficit present.      Mental Status: He is alert.      Motor: No weakness.      Gait: Gait normal.   Psychiatric:         Mood and Affect: Mood normal.         Behavior:  Behavior normal.         Thought Content: Thought content normal.         Judgment: Judgment normal.               ASSESSMENT & PLAN:    1. Hematuria, unspecified type  Patient evaluated by Joyce HADDAD 4/2/25 for hematuria   Urine dipstick +trace blood, culture negative, CT without stone or other abnormality   Continues to have intermittent hematuria but overall improving  Question benign exercise induced hematuria based on worsening of symptoms after physical activity   Recommend adequate hydration   F/U with Urology if persistent/worsening   -     Ambulatory referral/consult to Urology; Future; Expected date: 04/22/2025      2. Anxiety disorder with panic attacks  Assessment & Plan:  Managed with infrequent use of Lorazepam PRN   reviewed no suspicious activity   Lorazepam PRN refilled  Orders:  -     LORazepam (ATIVAN) 0.5 MG tablet; Take 1 tablet (0.5 mg total) by mouth 2 (two) times daily as needed for Anxiety.  Dispense: 10 tablet; Refill: 0      3. Postoperative hypothyroidism  4. Hx of papillary thyroid carcinoma  Assessment & Plan:  Patient reports papillary thyroid cancer treated with resection + radiation 2021, no records available   Following with Dr. Redmond in Endocrinology   TSH goal <2.0 given cancer history - recently 2.49  Currently on levothyroxine 137 mcg      5. History of TB (tuberculosis)  Assessment & Plan:  Reports TB in 1984 s/p tx  Denies any respiratory symptoms  No further evaluation/treatment at this time      6. Need for vaccination  -     DIPH,PERTUSS(ACEL),TET VAC(PF)(ADULT)(ADACEL)(TDaP)    7. Colon cancer screening  -     Ambulatory referral/consult to Endo Procedure ; Future; Expected date: 04/16/2025    8. Lipid screening  -     Lipid Panel; Future; Expected date: 04/15/2025    9. Screening for HIV without presence of risk factors  -     HIV 1/2 Ag/Ab (4th Gen); Future; Expected date: 04/15/2025    10. Encounter for hepatitis C screening test for low risk patient  -      Hepatitis C Antibody; Future; Expected date: 04/15/2025    I spent a total of 47 minutes on the day of the visit.  This includes face to face time and non-face to face time preparing to see the patient (eg, review of tests), obtaining and/or reviewing separately obtained history, documenting clinical information in the electronic or other health record, independently interpreting results and communicating results to the patient/family/caregiver, or care coordinator.          Mana Guerrier MD  [facility]

## 2025-04-15 NOTE — ASSESSMENT & PLAN NOTE
Reports TB in 1984 s/p tx  Denies any respiratory symptoms  No further evaluation/treatment at this time

## 2025-04-15 NOTE — PATIENT INSTRUCTIONS
Referral to Urology has been ordered if your blood in urine persists. You may schedule appointments when you check out today, online, or by calling [phone].      Referral for colonoscopy scheduling has been ordered. They will call you to schedule your exam.    Lorazepam (Ativan) refilled.

## 2025-04-15 NOTE — ASSESSMENT & PLAN NOTE
Managed with infrequent use of Lorazepam PRN   reviewed no suspicious activity   Lorazepam PRN refilled

## 2025-04-16 ENCOUNTER — LAB VISIT (OUTPATIENT)
Dept: LAB | Facility: HOSPITAL | Age: 55
End: 2025-04-16
Payer: COMMERCIAL

## 2025-04-16 ENCOUNTER — OFFICE VISIT (OUTPATIENT)
Dept: UROLOGY | Facility: CLINIC | Age: 55
End: 2025-04-16
Payer: COMMERCIAL

## 2025-04-16 VITALS
SYSTOLIC BLOOD PRESSURE: 132 MMHG | DIASTOLIC BLOOD PRESSURE: 82 MMHG | WEIGHT: 187.38 LBS | HEART RATE: 72 BPM | HEIGHT: 72 IN | BODY MASS INDEX: 25.38 KG/M2

## 2025-04-16 DIAGNOSIS — Z12.5 SCREENING FOR PROSTATE CANCER: Primary | ICD-10-CM

## 2025-04-16 DIAGNOSIS — R31.9 HEMATURIA, UNSPECIFIED TYPE: ICD-10-CM

## 2025-04-16 DIAGNOSIS — Z12.5 SCREENING FOR PROSTATE CANCER: ICD-10-CM

## 2025-04-16 LAB — PSA SERPL-MCNC: 0.52 NG/ML

## 2025-04-16 PROCEDURE — 99204 OFFICE O/P NEW MOD 45 MIN: CPT | Mod: S$GLB,,, | Performed by: UROLOGY

## 2025-04-16 PROCEDURE — 84153 ASSAY OF PSA TOTAL: CPT

## 2025-04-16 PROCEDURE — 3075F SYST BP GE 130 - 139MM HG: CPT | Mod: CPTII,S$GLB,, | Performed by: UROLOGY

## 2025-04-16 PROCEDURE — 99999 PR PBB SHADOW E&M-EST. PATIENT-LVL III: CPT | Mod: PBBFAC,,, | Performed by: UROLOGY

## 2025-04-16 PROCEDURE — 3079F DIAST BP 80-89 MM HG: CPT | Mod: CPTII,S$GLB,, | Performed by: UROLOGY

## 2025-04-16 PROCEDURE — 36415 COLL VENOUS BLD VENIPUNCTURE: CPT

## 2025-04-16 PROCEDURE — 1159F MED LIST DOCD IN RCRD: CPT | Mod: CPTII,S$GLB,, | Performed by: UROLOGY

## 2025-04-16 PROCEDURE — 3008F BODY MASS INDEX DOCD: CPT | Mod: CPTII,S$GLB,, | Performed by: UROLOGY

## 2025-04-16 NOTE — PROGRESS NOTES
[facility]  Urologic Oncology      Date of Service: 04/16/2025    Chief Complaint/Reason for Consultation: gross hematuria    Requesting Provider:   Mana Guerrier MD  [address] [place] [address]    Urologic Oncology Problem List:  Gross Hematuria    History of Present Illness:   History of Present Illness            54 year old male referred for concern of hematuria. Relates it to strenuous activity. No recent UTIs. No personal or family history of urologic cancers. Endorses any previous smoking history. Smoked for 10 pack years. Quit approximately 5 years ago. Endorses a previous history of hematuria. He has not had a hematuria workup performed before.    Patient endorses no additional complaints at this time.    Imaging: I have reviewed the imaging study CTRSS personally, have independently interpreted this study, and agree with its findings.    Current Problem List:  Patient Active Problem List    Diagnosis Date Noted    History of TB (tuberculosis) 04/15/2025    Anxiety disorder with panic attacks 04/15/2025    Vitamin D deficiency 11/09/2023    Postoperative hypothyroidism 01/30/2023    Hx of papillary thyroid carcinoma 01/30/2023        Allergies:  Review of patient's allergies indicates:  No Known Allergies     Medications per EMR:  Prescriptions Prior to Admission[1]    Past Medical History:  Past Medical History:   Diagnosis Date    Anxiety disorder with panic attacks 04/15/2025    History of TB (tuberculosis) 04/15/2025    Hx of papillary thyroid carcinoma 01/30/2023    Postoperative hypothyroidism 01/30/2023        Past Surgical History:  Past Surgical History:   Procedure Laterality Date    APPENDECTOMY  2011    HERNIA REPAIR  2004    THYROIDECTOMY  2021        Family History:  Family History   Problem Relation Name Age of Onset    Cancer Father Star     COPD Maternal Aunt Pinky     Cancer Maternal Uncle Miki         Social History:  Social History     Tobacco Use    Smoking  status: Former     Current packs/day: 0.00     Average packs/day: 0.5 packs/day for 15.0 years (7.5 ttl pk-yrs)     Types: Cigarettes     Quit date: 2019     Years since quittin.2    Smokeless tobacco: Never   Substance Use Topics    Alcohol use: Yes     Alcohol/week: 6.0 standard drinks of alcohol     Types: 6 Cans of beer per week          OBJECTIVE:     Vitals:    25 1509   BP: 132/82   BP Location: Right arm   Patient Position: Sitting   Pulse: 72   Weight: 85 kg (187 lb 6.3 oz)   Height: 6' (1.829 m)        Physical Exam    General: No acute distress. Nontoxic appearing.  HENT: Normocephalic. Atraumatic.  Respiratory: Normal respiratory effort. No conversational dyspnea. No audible wheezing.  Abdomen: No obvious distension.  Skin: No visible abnormalities.  Extremities: No edema upper extremities. No edema lower extremities.  Neurological: Alert and oriented x3. Normal speech.  Psychiatric: Normal mood. Normal affect. No evidence of SI.          LAB:    CBC:  Lab Results   Component Value Date    WBC 5.98 2025    HGB 14.8 2025    HCT 46.4 2025    MCV 94 2025     2025         BMP:  Lab Results   Component Value Date     2025    K 3.9 2025     2025    CO2 27 2025    BUN 14 2025    CREATININE 0.9 2025    CALCIUM 8.7 2025    ANIONGAP 10 2025    EGFRNORACEVR >60 2025         ASSESSMENT/PLAN:   Assessment & Plan            Gross Hematuria    - He has gross hematuria.    I counseled the patient on the AUA Guidelines for a hematuria workup. An evaluation is recommend on all patients with gross hematuria. The goal of upper tract imaging in patients is to identify malignancies of the renal parenchyma and upper tract urothelium, as well as to identify actionable non-malignant diagnoses of the kidney, collecting system, and ureters. The choice of imaging modality involves tradeoffs between diagnostic accuracy  versus risk. The evaluation also involves a flexible cystoscopy performed in the clinic. A urine cytology may also be sent at the time of cystoscopy.    - Plan for flexible cystoscopy, urine cytology, and CT urogram.      - code applied: patient requires or will require a continuous, longitudinal, and active collaborative plan of care related to this patient's health condition, gross hematuria --the management of which requires the direction of a practitioner with specialized clinical knowledge, skill, and expertise.     I spent a total of 20 minutes on the day of the visit.This includes face to face time and non-face to face time preparing to see the patient (eg, review of tests), obtaining and/or reviewing separately obtained history, documenting clinical information in the electronic or other health record, independently interpreting results and communicating results to the patient/family/caregiver, or care coordinator    This encounter was dictated and transcribed using DeepScribe and FluencyDirect, please excuse any typographical or grammatical errors.          [1] (Not in a hospital admission)

## 2025-04-23 ENCOUNTER — HOSPITAL ENCOUNTER (OUTPATIENT)
Dept: RADIOLOGY | Facility: HOSPITAL | Age: 55
Discharge: HOME | End: 2025-04-23
Attending: UROLOGY
Payer: COMMERCIAL

## 2025-04-23 DIAGNOSIS — R31.9 HEMATURIA, UNSPECIFIED TYPE: ICD-10-CM

## 2025-04-23 PROCEDURE — 25500020 PHARM REV CODE 255: Performed by: UROLOGY

## 2025-04-23 PROCEDURE — 74178 CT ABD&PLV WO CNTR FLWD CNTR: CPT | Mod: 26,,, | Performed by: RADIOLOGY

## 2025-04-23 PROCEDURE — 74178 CT ABD&PLV WO CNTR FLWD CNTR: CPT | Mod: TC

## 2025-04-23 RX ADMIN — IOHEXOL 100 ML: 350 INJECTION, SOLUTION INTRAVENOUS at 06:04

## 2025-04-25 ENCOUNTER — TELEPHONE (OUTPATIENT)
Dept: UROLOGY | Facility: CLINIC | Age: 55
End: 2025-04-25
Payer: COMMERCIAL

## 2025-04-25 ENCOUNTER — PATIENT MESSAGE (OUTPATIENT)
Dept: UROLOGY | Facility: CLINIC | Age: 55
End: 2025-04-25
Payer: COMMERCIAL

## 2025-04-25 NOTE — TELEPHONE ENCOUNTER
Confirmed appt. Instructions given to park on the [place] parking lot. Check in on the 2nd floor of the [facility]. Please arrive 15 minutes prior to procedure start time and be ready to provide urine specimen. Thank you!

## 2025-04-28 ENCOUNTER — PROCEDURE VISIT (OUTPATIENT)
Dept: UROLOGY | Facility: CLINIC | Age: 55
End: 2025-04-28
Payer: COMMERCIAL

## 2025-04-28 ENCOUNTER — PATIENT MESSAGE (OUTPATIENT)
Dept: INTERNAL MEDICINE | Facility: CLINIC | Age: 55
End: 2025-04-28
Payer: COMMERCIAL

## 2025-04-28 VITALS
RESPIRATION RATE: 18 BRPM | SYSTOLIC BLOOD PRESSURE: 131 MMHG | HEART RATE: 65 BPM | BODY MASS INDEX: 25.52 KG/M2 | DIASTOLIC BLOOD PRESSURE: 87 MMHG | TEMPERATURE: 98 F | HEIGHT: 72 IN | WEIGHT: 188.38 LBS

## 2025-04-28 DIAGNOSIS — R31.9 HEMATURIA, UNSPECIFIED TYPE: ICD-10-CM

## 2025-04-28 PROCEDURE — 52000 CYSTOURETHROSCOPY: CPT | Mod: S$GLB,,, | Performed by: UROLOGY

## 2025-04-28 PROCEDURE — 88112 CYTOPATH CELL ENHANCE TECH: CPT | Mod: TC

## 2025-04-28 RX ORDER — LIDOCAINE HYDROCHLORIDE 20 MG/ML
JELLY TOPICAL ONCE
Status: COMPLETED | OUTPATIENT
Start: 2025-04-28 | End: 2025-04-28

## 2025-04-28 RX ADMIN — LIDOCAINE HYDROCHLORIDE 5 ML: 20 JELLY TOPICAL at 09:04

## 2025-04-28 NOTE — PROCEDURES
Cystoscopy    Date/Time: 4/28/2025 9:30 AM    Performed by: Sánchez Carey MD  Authorized by: Sánchez Carey MD      Office Cystoscopy Procedure Note    Date of Procedure: 04/28/2025    Indication:  Hematuria      Informed consent:  The risks, benefits, complications, treatment options, and expected outcomes were discussed with the patient. The patient concurred with the proposed plan and provided informed consent.     Anesthesia: Lidocaine jelly 2%     Antibiotic prophylaxis: None     Procedure:  The patient was placed in the lithotomy position, was prepped and draped in the usual manner using sterile technique, and 2% lidocaine jelly instilled into the urethra.  A procedural timeout was performed identifying the patient, all in attendance were in agreement, including the patient. A 17 F flexible cystoscope was then inserted into the urethra and the urethra and bladder carefully examined.  The following findings were noted:     Findings:   1. Normal anterior urethra without evidence of strictures or tumors   2. Prostatic urethra open without signs of obstruction  3. Bladder free of masses, tumors, lesions.  Ureteral orifices in orthotopic position bilaterally        Specimens: None   Complications: None; patient tolerated the procedure well          Disposition: Home after brief observation   Condition: Stable     Assessment:  54-year-old male with a history of gross hematuria    Plan:   -negative gross hematuria workup, cytology pending  -NAVDEEP today with no palpable masses or tumors, PSA 0.5 to on 04/16/2025  -follow up urology [place] with UA prior    Attending Attestation:      I personally performed the procedure.     Sánchez Carey  11:55 AM  04/28/2025

## 2025-04-29 ENCOUNTER — CLINICAL SUPPORT (OUTPATIENT)
Dept: ENDOSCOPY | Facility: HOSPITAL | Age: 55
End: 2025-04-29
Payer: COMMERCIAL

## 2025-04-29 ENCOUNTER — TELEPHONE (OUTPATIENT)
Dept: ENDOSCOPY | Facility: HOSPITAL | Age: 55
End: 2025-04-29

## 2025-04-29 VITALS — HEIGHT: 73 IN | BODY MASS INDEX: 24.92 KG/M2 | WEIGHT: 188 LBS

## 2025-04-29 DIAGNOSIS — Z12.11 COLON CANCER SCREENING: ICD-10-CM

## 2025-04-29 DIAGNOSIS — Z12.11 ENCOUNTER FOR SCREENING COLONOSCOPY: Primary | ICD-10-CM

## 2025-04-29 LAB
ESTROGEN SERPL-MCNC: NORMAL PG/ML
INSULIN SERPL-ACNC: NORMAL U[IU]/ML
LAB AP CLINICAL INFORMATION: NORMAL
LAB AP GROSS DESCRIPTION: NORMAL
LAB AP PERFORMING LOCATION(S): NORMAL
LAB AP URINE CYTOLOGY INTERPRETATION SPECIMEN 1: NORMAL

## 2025-04-30 NOTE — PLAN OF CARE
Referral for procedure from PAT appointment      Spoke to patient to schedule procedure(s) Colonoscopy       Physician to perform procedure(s) Dr. Antoine  Date of Procedure (s) 05/12/2025  Arrival Time 1:30 Pm   Time of Procedure(s) 2:20 Pm    Location of Procedure(s) [place] 4th Floor  Type of Rx Prep sent to patient: PEG  Instructions provided to patient via [facility]    Patient was informed on the following information and verbalized understanding. Screening questionnaire reviewed with patient and complete. If procedure requires anesthesia, a responsible adult needs to be present to accompany the patient home, patient cannot drive after receiving anesthesia. Appointment details are tentative, especially check-in time. Patient will receive a prep-op call 7 days prior to confirm check-in time for procedure. If applicable the patient should contact their pharmacy to verify Rx for procedure prep is ready for pick-up. Patient was advised to call the scheduling department at [phone] if pharmacy states no Rx is available. Patient was advised to call the endoscopy scheduling department if any questions or concerns arise.      [facility] Scheduling Department

## 2025-04-30 NOTE — TELEPHONE ENCOUNTER
Referral for procedure from PAT appointment      Spoke to patient to schedule procedure(s) Colonoscopy       Physician to perform procedure(s) Dr. Antoine  Date of Procedure (s) 05/12/2025  Arrival Time 1:30 Pm   Time of Procedure(s) 2:20 Pm    Location of Procedure(s) [place] 4th Floor  Type of Rx Prep sent to patient: PEG  Instructions provided to patient via [facility]    Patient was informed on the following information and verbalized understanding. Screening questionnaire reviewed with patient and complete. If procedure requires anesthesia, a responsible adult needs to be present to accompany the patient home, patient cannot drive after receiving anesthesia. Appointment details are tentative, especially check-in time. Patient will receive a prep-op call 7 days prior to confirm check-in time for procedure. If applicable the patient should contact their pharmacy to verify Rx for procedure prep is ready for pick-up. Patient was advised to call the scheduling department at [phone] if pharmacy states no Rx is available. Patient was advised to call the endoscopy scheduling department if any questions or concerns arise.      [facility] Scheduling Department

## 2025-05-02 ENCOUNTER — LAB VISIT (OUTPATIENT)
Dept: LAB | Facility: HOSPITAL | Age: 55
End: 2025-05-02
Attending: HOSPITALIST
Payer: COMMERCIAL

## 2025-05-02 DIAGNOSIS — Z85.850 HX OF PAPILLARY THYROID CARCINOMA: ICD-10-CM

## 2025-05-02 DIAGNOSIS — Z13.220 LIPID SCREENING: ICD-10-CM

## 2025-05-02 DIAGNOSIS — Z11.59 ENCOUNTER FOR HEPATITIS C SCREENING TEST FOR LOW RISK PATIENT: ICD-10-CM

## 2025-05-02 DIAGNOSIS — E89.0 POSTOPERATIVE HYPOTHYROIDISM: ICD-10-CM

## 2025-05-02 DIAGNOSIS — Z11.4 SCREENING FOR HIV WITHOUT PRESENCE OF RISK FACTORS: ICD-10-CM

## 2025-05-02 LAB
CHOLEST SERPL-MCNC: 218 MG/DL (ref 120–199)
CHOLEST/HDLC SERPL: 5.1 {RATIO} (ref 2–5)
EAG (OHS): 105 MG/DL (ref 68–131)
HBA1C MFR BLD: 5.3 % (ref 4–5.6)
HCV AB SERPL QL IA: NORMAL
HDLC SERPL-MCNC: 43 MG/DL (ref 40–75)
HDLC SERPL: 19.7 % (ref 20–50)
HIV 1+2 AB+HIV1 P24 AG SERPL QL IA: NORMAL
LDLC SERPL CALC-MCNC: 148.2 MG/DL (ref 63–159)
NONHDLC SERPL-MCNC: 175 MG/DL
T4 FREE SERPL-MCNC: 1.4 NG/DL (ref 0.71–1.51)
TRIGL SERPL-MCNC: 134 MG/DL (ref 30–150)
TSH SERPL-ACNC: 0.25 UIU/ML (ref 0.4–4)

## 2025-05-02 PROCEDURE — 84439 ASSAY OF FREE THYROXINE: CPT

## 2025-05-02 PROCEDURE — 84443 ASSAY THYROID STIM HORMONE: CPT

## 2025-05-02 PROCEDURE — 83036 HEMOGLOBIN GLYCOSYLATED A1C: CPT

## 2025-05-02 PROCEDURE — 87389 HIV-1 AG W/HIV-1&-2 AB AG IA: CPT

## 2025-05-02 PROCEDURE — 36415 COLL VENOUS BLD VENIPUNCTURE: CPT

## 2025-05-02 PROCEDURE — 86803 HEPATITIS C AB TEST: CPT

## 2025-05-02 PROCEDURE — 80061 LIPID PANEL: CPT

## 2025-05-02 PROCEDURE — 86800 THYROGLOBULIN ANTIBODY: CPT

## 2025-05-05 ENCOUNTER — RESULTS FOLLOW-UP (OUTPATIENT)
Dept: INTERNAL MEDICINE | Facility: CLINIC | Age: 55
End: 2025-05-05

## 2025-05-05 LAB
ENDOCRINOLOGIST REVIEW: NORMAL
THYROGLOB AB SERPL IA-ACNC: <1.8 IU/ML
THYROGLOB SERPL-MCNC: <0.1 NG/ML

## 2025-05-12 ENCOUNTER — ANESTHESIA EVENT (OUTPATIENT)
Dept: ENDOSCOPY | Facility: HOSPITAL | Age: 55
End: 2025-05-12
Payer: COMMERCIAL

## 2025-05-12 ENCOUNTER — ANESTHESIA (OUTPATIENT)
Dept: ENDOSCOPY | Facility: HOSPITAL | Age: 55
End: 2025-05-12
Payer: COMMERCIAL

## 2025-05-12 ENCOUNTER — HOSPITAL ENCOUNTER (OUTPATIENT)
Facility: HOSPITAL | Age: 55
Discharge: HOME | End: 2025-05-12
Attending: INTERNAL MEDICINE | Admitting: INTERNAL MEDICINE
Payer: COMMERCIAL

## 2025-05-12 VITALS
OXYGEN SATURATION: 100 % | SYSTOLIC BLOOD PRESSURE: 141 MMHG | RESPIRATION RATE: 13 BRPM | TEMPERATURE: 97 F | BODY MASS INDEX: 24.16 KG/M2 | HEART RATE: 53 BPM | HEIGHT: 73 IN | DIASTOLIC BLOOD PRESSURE: 90 MMHG | WEIGHT: 182.31 LBS

## 2025-05-12 DIAGNOSIS — Z12.11 COLON CANCER SCREENING: ICD-10-CM

## 2025-05-12 DIAGNOSIS — Z12.11 SCREENING FOR COLON CANCER: Primary | ICD-10-CM

## 2025-05-12 PROCEDURE — E9220 PRA ENDO ANESTHESIA: HCPCS | Mod: ,,, | Performed by: NURSE ANESTHETIST, CERTIFIED REGISTERED

## 2025-05-12 PROCEDURE — 37000008 HC ANESTHESIA 1ST 15 MINUTES: Performed by: INTERNAL MEDICINE

## 2025-05-12 PROCEDURE — 45385 COLONOSCOPY W/LESION REMOVAL: CPT | Mod: 33 | Performed by: INTERNAL MEDICINE

## 2025-05-12 PROCEDURE — 25000003 PHARM REV CODE 250: Performed by: INTERNAL MEDICINE

## 2025-05-12 PROCEDURE — 45385 COLONOSCOPY W/LESION REMOVAL: CPT | Mod: 33,,, | Performed by: INTERNAL MEDICINE

## 2025-05-12 PROCEDURE — 37000009 HC ANESTHESIA EA ADD 15 MINS: Performed by: INTERNAL MEDICINE

## 2025-05-12 PROCEDURE — 88305 TISSUE EXAM BY PATHOLOGIST: CPT | Mod: TC,91 | Performed by: INTERNAL MEDICINE

## 2025-05-12 PROCEDURE — 63600175 PHARM REV CODE 636 W HCPCS: Performed by: NURSE ANESTHETIST, CERTIFIED REGISTERED

## 2025-05-12 PROCEDURE — 27201089 HC SNARE, DISP (ANY): Performed by: INTERNAL MEDICINE

## 2025-05-12 RX ORDER — LIDOCAINE HYDROCHLORIDE 20 MG/ML
INJECTION, SOLUTION EPIDURAL; INFILTRATION; INTRACAUDAL; PERINEURAL
Status: DISCONTINUED | OUTPATIENT
Start: 2025-05-12 | End: 2025-05-12

## 2025-05-12 RX ORDER — PROPOFOL 10 MG/ML
VIAL (ML) INTRAVENOUS
Status: DISCONTINUED | OUTPATIENT
Start: 2025-05-12 | End: 2025-05-12

## 2025-05-12 RX ORDER — SODIUM CHLORIDE 9 MG/ML
INJECTION, SOLUTION INTRAVENOUS CONTINUOUS
Status: DISCONTINUED | OUTPATIENT
Start: 2025-05-12 | End: 2025-05-12 | Stop reason: HOSPADM

## 2025-05-12 RX ADMIN — SODIUM CHLORIDE: 0.9 INJECTION, SOLUTION INTRAVENOUS at 02:05

## 2025-05-12 RX ADMIN — LIDOCAINE HYDROCHLORIDE 50 MG: 20 INJECTION, SOLUTION EPIDURAL; INFILTRATION; INTRACAUDAL; PERINEURAL at 02:05

## 2025-05-12 RX ADMIN — PROPOFOL 250 MCG/KG/MIN: 10 INJECTION, EMULSION INTRAVENOUS at 02:05

## 2025-05-12 RX ADMIN — PROPOFOL 150 MG: 10 INJECTION, EMULSION INTRAVENOUS at 02:05

## 2025-05-12 NOTE — ANESTHESIA PREPROCEDURE EVALUATION
05/12/2025  Kwadwo Forrest is a 54 y.o., male.  [facility]  Anesthesia Pre-Operative Evaluation       Patient Name: Kwadwo Forrest  YOB: 1970  MRN: [id number]  CSN: [id number]      Code Status: No Order   Date of Procedure: 5/12/2025  Anesthesia: Choice Procedure: Procedure(s) (LRB):  COLONOSCOPY, SCREENING, LOW RISK PATIENT (N/A)  Pre-Operative Diagnosis: Colon cancer screening [Z12.11]  Proceduralist: Surgeons and Role:     * Yeison Antoine MD - Primary        SUBJECTIVE:   Kwadwo Forrset is a 54 y.o. male who  has a past medical history of Anxiety disorder with panic attacks (04/15/2025), History of TB (tuberculosis) (04/15/2025), papillary thyroid carcinoma (01/30/2023), and Postoperative hypothyroidism (01/30/2023)..     he has a current medication list which includes the following long-term medication(s): levothyroxine and lorazepam.     ALLERGIES:   Review of patient's allergies indicates:  No Known Allergies  LDA:          Lines/Drains/Airways       Peripheral Intravenous Line  Duration                  Peripheral IV - Single Lumen 05/12/25 1337 20 G Right Antecubital <1 day                   Anesthesia Evaluation      Airway   Mallampati: II  TM distance: Normal  Neck ROM: Normal ROM  Dental    (+) Intact    Pulmonary    Cardiovascular   Exercise tolerance: good    ECG reviewed    Neuro/Psych      GI/Hepatic/Renal      Endo/Other    (+) hypothyroidism  Abdominal                     MEDICATIONS:     Antibiotics (From admission, onward)      None          VTE Risk Mitigation (From admission, onward)      None              Current Medications[1]       History:   There are no hospital problems to display for this patient.    Surgical History:    has a past surgical history that includes Hernia repair (2004); Appendectomy (2011); and Thyroidectomy (2021).   Social  "History:    reports being sexually active and has had partner(s) who are female. He reports using the following method of birth control/protection: See Surgical Hx.  reports that he quit smoking about 6 years ago. His smoking use included cigarettes. He has a 7.5 pack-year smoking history. He has never used smokeless tobacco. He reports current alcohol use of about 6.0 standard drinks of alcohol per week. He reports current drug use. Frequency: 7.00 times per week. Drug: Marijuana.     OBJECTIVE:     Vital Signs (Most Recent):  Temp: 36.6 °C (97.9 °F) (05/12/25 1331)  Pulse: 66 (05/12/25 1331)  Resp: 13 (05/12/25 1331)  BP: (!) 140/87 (05/12/25 1331)  SpO2: 99 % (05/12/25 1331) Vital Signs Range (Last 24H):  Temp:  [36.6 °C (97.9 °F)]   Pulse:  [66]   Resp:  [13]   BP: (140)/(87)   SpO2:  [99 %]        Body mass index is 24.05 kg/m².   Wt Readings from Last 4 Encounters:   05/12/25 82.7 kg (182 lb 5.1 oz)   04/29/25 85.3 kg (188 lb)   04/28/25 85.5 kg (188 lb 6.1 oz)   04/16/25 85 kg (187 lb 6.3 oz)       Significant Labs:  Lab Results   Component Value Date    WBC 5.98 04/01/2025    HGB 14.8 04/01/2025    HCT 46.4 04/01/2025     04/01/2025     04/01/2025    K 3.9 04/01/2025     04/01/2025    CREATININE 0.9 04/01/2025    BUN 14 04/01/2025    CO2 27 04/01/2025    GLU 97 04/01/2025    CALCIUM 8.7 04/01/2025    PHOS 2.4 (L) 05/09/2024    ALKPHOS 63 04/01/2025    ALT 24 04/01/2025    AST 20 04/01/2025    ALBUMIN 4.1 04/01/2025    INR 0.9 04/01/2025    PROTIME 10.3 04/01/2025    APTT 28.6 04/01/2025    HGBA1C 5.3 05/02/2025     No LMP for male patient.  No results found for this or any previous visit (from the past 72 hours).    EKG:   No results found for this or any previous visit.    TTE:  No results found for this or any previous visit.  No results found for: "EF"   No results found for this or any previous visit.  DAI:  No results found for this or any previous visit.  Stress Test:  No results " "found for this or any previous visit.     LHC:  No results found for this or any previous visit.     PFT:  No results found for: "FEV1", "FVC", "WLB5XBJ", "TLC", "DLCO"     ASSESSMENT/PLAN:          Pre-op Assessment    I have reviewed the Patient Summary Reports.     I have reviewed the Nursing Notes. I have reviewed the NPO Status.   I have reviewed the Medications.     Review of Systems  Anesthesia Hx:  No problems with previous Anesthesia             Denies Family Hx of Anesthesia complications.    Denies Personal Hx of Anesthesia complications.                    Social:     Alcohol Use: Pt consumes 6/wk Illicit Drug Use: Types of drugs include Marijuana drug use is current.  Hematology/Oncology:  Hematology Normal   Oncology Normal                                   EENT/Dental:  EENT/Dental Normal           Cardiovascular:  Cardiovascular Normal Exercise tolerance: good                 ECG has been reviewed.                            Pulmonary:  Pulmonary Normal        H/o TB               Renal/:  Renal/ Normal                 Hepatic/GI:  Hepatic/GI Normal                    Musculoskeletal:  Musculoskeletal Normal                Neurological:  Neurology Normal                                      Endocrine:   Hypothyroidism  S/p thyroidectomy for papillary thyroid carcinoma        Dermatological:  Skin Normal    Psych:   anxiety  Panic attacks               Physical Exam  General: Well nourished    Airway:  Mallampati: II   Mouth Opening: Normal  TM Distance: Normal  Tongue: Normal  Neck ROM: Normal ROM    Dental:  Intact        Anesthesia Plan  Type of Anesthesia, risks & benefits discussed:    Anesthesia Type: Gen Natural Airway  Intra-op Monitoring Plan: Standard ASA Monitors  Post Op Pain Control Plan: multimodal analgesia and IV/PO Opioids PRN  Induction:  IV  Informed Consent: Informed consent signed with the Patient and all parties understand the risks and agree with anesthesia plan.  All " questions answered. Patient consented to blood products? No  ASA Score: 2  Day of Surgery Review of History & Physical: H&P Update referred to the surgeon/provider.I have interviewed and examined the patient. I have reviewed the patient's H&P dated:     Ready For Surgery From Anesthesia Perspective.     .           [1]   Current Facility-Administered Medications   Medication Dose Route Frequency Provider Last Rate Last Admin    0.9% NaCl infusion   Intravenous Continuous Yeison Antoine MD

## 2025-05-12 NOTE — TRANSFER OF CARE
"Anesthesia Transfer of Care Note    Patient: Kwadwo Forrest    Procedure(s) Performed: Procedure(s) (LRB):  COLONOSCOPY, SCREENING, LOW RISK PATIENT (N/A)    Patient location: GI    Anesthesia Type: general    Transport from OR: Transported from OR on room air with adequate spontaneous ventilation    Post pain: adequate analgesia    Post assessment: no apparent anesthetic complications    Post vital signs: stable    Level of consciousness: awake and alert    Nausea/Vomiting: no nausea/vomiting    Complications: none    Transfer of care protocol was followed      Last vitals: Visit Vitals  /66 (BP Location: Left arm, Patient Position: Lying)   Pulse (!) 57   Temp 36.3 °C (97.3 °F) (Temporal)   Resp 16   Ht 6' 1" (1.854 m)   Wt 82.7 kg (182 lb 5.1 oz)   SpO2 100%   BMI 24.05 kg/m²     "

## 2025-05-12 NOTE — PROVATION PATIENT INSTRUCTIONS
Discharge Summary/Instructions after an Endoscopic Procedure  Patient Name: Kwadwo Forrest  Patient MRN: [id number]  Patient YOB: 1970  Monday, May 12, 2025  Yeison Antoine MD  Dear patient,  As a result of recent federal legislation (The Federal Cures Act), you may   receive lab or pathology results from your procedure in your [facility]   account before your physician is able to contact you. Your physician or   their representative will relay the results to you with their   recommendations at their soonest availability.  Thank you,  RESTRICTIONS:  During your procedure today, you received medications for sedation.  These   medications may affect your judgment, balance and coordination.  Therefore,   for 24 hours, you have the following restrictions:   - DO NOT drive a car, operate machinery, make legal/financial decisions,   sign important papers or drink alcohol.    ACTIVITY:  Today: no heavy lifting, straining or running due to procedural   sedation/anesthesia.  The following day: return to full activity including work.  DIET:  Eat and drink normally unless instructed otherwise.     TREATMENT FOR COMMON SIDE EFFECTS:  - Mild abdominal pain, nausea, belching, bloating or excessive gas:  rest,   eat lightly and use a heating pad.  - Sore Throat: treat with throat lozenges and/or gargle with warm salt   water.  - Because air was used during the procedure, expelling large amounts of air   from your rectum or belching is normal.  - If a bowel prep was taken, you may not have a bowel movement for 1-3 days.    This is normal.  SYMPTOMS TO WATCH FOR AND REPORT TO YOUR PHYSICIAN:  1. Abdominal pain or bloating, other than gas cramps.  2. Chest pain.  3. Back pain.  4. Signs of infection such as: chills or fever occurring within 24 hours   after the procedure.  5. Rectal bleeding, which would show as bright red, maroon, or black stools.   (A tablespoon of blood from the rectum is not serious, especially  if   hemorrhoids are present.)  6. Vomiting.  7. Weakness or dizziness.  GO DIRECTLY TO THE NEAREST EMERGENCY ROOM IF YOU HAVE ANY OF THE FOLLOWING:      Difficulty breathing              Chills and/or fever over 101 F   Persistent vomiting and/or vomiting blood   Severe abdominal pain   Severe chest pain   Black, tarry stools   Bleeding- more than one tablespoon   Any other symptom or condition that you feel may need urgent attention  Your doctor recommends these additional instructions:  If any biopsies were taken, your doctors clinic will contact you in 1 to 2   weeks with any results.  - Discharge patient to home (ambulatory).   - Patient has a contact number available for emergencies.  The signs and   symptoms of potential delayed complications were discussed with the   patient.  Return to normal activities tomorrow.  Written discharge   instructions were provided to the patient.   - Resume previous diet.   - Continue present medications.   - Return to primary care physician as previously scheduled.   - Repeat colonoscopy in 1 year for surveillance after piecemeal   polypectomy.  For questions, problems or results please call your physician - Yeison Antoine MD at Work:  [phone].  [facility], EMERGENCY ROOM PHONE NUMBER: [phone]  IF A COMPLICATION OR EMERGENCY SITUATION ARISES AND YOU ARE UNABLE TO REACH   YOUR PHYSICIAN - GO DIRECTLY TO THE EMERGENCY ROOM.  MD Yeison Jeter MD  5/12/2025 3:09:51 PM  This report has been verified and signed electronically.  Dear patient,  As a result of recent federal legislation (The Federal Cures Act), you may   receive lab or pathology results from your procedure in your [facility]   account before your physician is able to contact you. Your physician or   their representative will relay the results to you with their   recommendations at their soonest availability.  Thank you,  PROVATION

## 2025-05-12 NOTE — PLAN OF CARE
Discharge summary and Provation reviewed verbalizes understanding. PIV removed, cannula intact. Coban applied instructed to remove once home. States understanding. Denies pain or discomfort. NAD note.d

## 2025-05-12 NOTE — H&P
Short Stay Endoscopy History and Physical    PCP - Mana Guerrier MD  Referring Physician - Mana Guerrier MD  [address] [place] [address]    Procedure - Colonoscopy  ASA - per anesthesia  Mallampati - per anesthesia  History of Anesthesia problems - no  Family history Anesthesia problems -  no   Plan of anesthesia - General    HPI  54 y.o. male  Reason for procedure:   Colon cancer screening [Z12.11]        ROS:  Constitutional: No fevers, chills, No weight loss  CV: No chest pain  Pulm: No cough, No shortness of breath  GI: see HPI    Medical History:  has a past medical history of Anxiety disorder with panic attacks (04/15/2025), History of TB (tuberculosis) (04/15/2025), papillary thyroid carcinoma (01/30/2023), and Postoperative hypothyroidism (01/30/2023).    Surgical History:  has a past surgical history that includes Hernia repair (2004); Appendectomy (2011); and Thyroidectomy (2021).    Family History: family history includes COPD in his maternal aunt; Cancer in his father; Cancer (age of onset: 49) in his maternal uncle..    Social History:  reports that he quit smoking about 6 years ago. His smoking use included cigarettes. He has a 7.5 pack-year smoking history. He has never used smokeless tobacco. He reports current alcohol use of about 6.0 standard drinks of alcohol per week. He reports current drug use. Frequency: 7.00 times per week. Drug: Marijuana.    Review of patient's allergies indicates:  No Known Allergies    Medications:   Prescriptions Prior to Admission[1]    Physical Exam:    Vital Signs:   Vitals:    05/12/25 1331   BP: (!) 140/87   Pulse: 66   Resp: 13   Temp: 97.9 °F (36.6 °C)       General Appearance: Well appearing in no acute distress  Abdomen: Soft, non tender, non distended with normal bowel sounds, no masses    Labs:  Lab Results   Component Value Date    WBC 5.98 04/01/2025    HGB 14.8 04/01/2025    HCT 46.4 04/01/2025     04/01/2025    CHOL 218  (H) 05/02/2025    TRIG 134 05/02/2025    HDL 43 05/02/2025    ALT 24 04/01/2025    AST 20 04/01/2025     04/01/2025    K 3.9 04/01/2025     04/01/2025    CREATININE 0.9 04/01/2025    BUN 14 04/01/2025    CO2 27 04/01/2025    TSH 0.254 (L) 05/02/2025    PSA 0.52 04/16/2025    INR 0.9 04/01/2025    HGBA1C 5.3 05/02/2025       I have explained the risks and benefits of this endoscopic procedure to the patient including but not limited to bleeding, inflammation, infection, perforation, and death.      Yeison Antoine MD       [1]   Medications Prior to Admission   Medication Sig Dispense Refill Last Dose/Taking    levothyroxine (SYNTHROID) 137 MCG Tab tablet Take 1 tablet (137 mcg total) by mouth before breakfast. 90 tablet 3 5/12/2025    LORazepam (ATIVAN) 0.5 MG tablet Take 1 tablet (0.5 mg total) by mouth 2 (two) times daily as needed for Anxiety. 10 tablet 0 Past Week

## 2025-05-13 ENCOUNTER — PATIENT MESSAGE (OUTPATIENT)
Dept: UROLOGY | Facility: CLINIC | Age: 55
End: 2025-05-13
Payer: COMMERCIAL

## 2025-05-14 LAB
ESTROGEN SERPL-MCNC: NORMAL PG/ML
INSULIN SERPL-ACNC: NORMAL U[IU]/ML
LAB AP CLINICAL INFORMATION: NORMAL
LAB AP GROSS DESCRIPTION: NORMAL
LAB AP PERFORMING LOCATION(S): NORMAL
LAB AP REPORT FOOTNOTES: NORMAL
T3RU NFR SERPL: NORMAL %

## 2025-05-14 NOTE — ANESTHESIA POSTPROCEDURE EVALUATION
Anesthesia Post Evaluation    Patient: Kwadwo Forrest    Procedure(s) Performed: Procedure(s) (LRB):  COLONOSCOPY, SCREENING, LOW RISK PATIENT (N/A)    Final Anesthesia Type: general      Patient location during evaluation: GI PACU  Patient participation: Yes- Able to Participate  Level of consciousness: awake and alert  Post-procedure vital signs: reviewed and stable  Pain management: adequate  Airway patency: patent  JARED mitigation strategies: Multimodal analgesia, Preoperative use of mandibular advancement devices or oral appliances and Intraoperative administration of CPAP, nasopharyngeal airway, or oral appliance during sedation  PONV status at discharge: No PONV  Anesthetic complications: no      Cardiovascular status: blood pressure returned to baseline, hemodynamically stable and stable  Respiratory status: unassisted and spontaneous ventilation  Hydration status: euvolemic  Follow-up not needed.              Vitals Value Taken Time   /90 05/12/25 15:40   Temp 36.3 °C (97.3 °F) 05/12/25 15:10   Pulse 53 05/12/25 15:40   Resp 13 05/12/25 15:40   SpO2 100 % 05/12/25 15:40         Event Time   Out of Recovery 15:47:18         Pain/Janneth Score: No data recorded

## 2025-05-15 ENCOUNTER — RESULTS FOLLOW-UP (OUTPATIENT)
Dept: GASTROENTEROLOGY | Facility: CLINIC | Age: 55
End: 2025-05-15

## 2025-05-16 DIAGNOSIS — R31.9 HEMATURIA, UNSPECIFIED TYPE: Primary | ICD-10-CM

## 2025-05-19 ENCOUNTER — OFFICE VISIT (OUTPATIENT)
Dept: ENDOCRINOLOGY | Facility: CLINIC | Age: 55
End: 2025-05-19
Payer: COMMERCIAL

## 2025-05-19 VITALS
DIASTOLIC BLOOD PRESSURE: 75 MMHG | SYSTOLIC BLOOD PRESSURE: 120 MMHG | WEIGHT: 187.38 LBS | HEART RATE: 65 BPM | BODY MASS INDEX: 24.72 KG/M2

## 2025-05-19 DIAGNOSIS — E89.0 POSTOPERATIVE HYPOTHYROIDISM: Primary | ICD-10-CM

## 2025-05-19 DIAGNOSIS — E55.9 VITAMIN D DEFICIENCY: ICD-10-CM

## 2025-05-19 DIAGNOSIS — Z85.850 HX OF PAPILLARY THYROID CARCINOMA: ICD-10-CM

## 2025-05-19 DIAGNOSIS — R31.0 GROSS HEMATURIA: ICD-10-CM

## 2025-05-19 PROCEDURE — G2211 COMPLEX E/M VISIT ADD ON: HCPCS | Mod: S$GLB,,, | Performed by: HOSPITALIST

## 2025-05-19 PROCEDURE — 3074F SYST BP LT 130 MM HG: CPT | Mod: CPTII,S$GLB,, | Performed by: HOSPITALIST

## 2025-05-19 PROCEDURE — 3008F BODY MASS INDEX DOCD: CPT | Mod: CPTII,S$GLB,, | Performed by: HOSPITALIST

## 2025-05-19 PROCEDURE — 1159F MED LIST DOCD IN RCRD: CPT | Mod: CPTII,S$GLB,, | Performed by: HOSPITALIST

## 2025-05-19 PROCEDURE — 99999 PR PBB SHADOW E&M-EST. PATIENT-LVL III: CPT | Mod: PBBFAC,,, | Performed by: HOSPITALIST

## 2025-05-19 PROCEDURE — 3044F HG A1C LEVEL LT 7.0%: CPT | Mod: CPTII,S$GLB,, | Performed by: HOSPITALIST

## 2025-05-19 PROCEDURE — 99214 OFFICE O/P EST MOD 30 MIN: CPT | Mod: S$GLB,,, | Performed by: HOSPITALIST

## 2025-05-19 PROCEDURE — 3078F DIAST BP <80 MM HG: CPT | Mod: CPTII,S$GLB,, | Performed by: HOSPITALIST

## 2025-05-19 PROCEDURE — 1160F RVW MEDS BY RX/DR IN RCRD: CPT | Mod: CPTII,S$GLB,, | Performed by: HOSPITALIST

## 2025-05-19 RX ORDER — LEVOTHYROXINE SODIUM 125 UG/1
125 TABLET ORAL
Qty: 90 TABLET | Refills: 3 | Status: SHIPPED | OUTPATIENT
Start: 2025-05-19

## 2025-05-19 NOTE — ASSESSMENT & PLAN NOTE
- Patient reports, unfortunately no pathology for review  - Per patient's diagnosis of Papillary Thyroid carcinoma, Status post LOPEZ 2021  - Ultrasound thyroid bed: 2023: No sign of remnant tissue.  Lymph nodes are normal on evaluation  - TG is undetectable, reassurance given.  - TSH goals discussed with patient. Goal is low TSH <2  - Monitor TG yearly.  So far 2023, 2024, 2025>>has been negative

## 2025-05-19 NOTE — ASSESSMENT & PLAN NOTE
- Pt with history of hypothyroidism due to post-surgical   - I reviewed lab work trends: TSH, Free T4, with patient in clinic today 5/19/2025   - Discussed and confirmed the proper way of taking levothyroxine: daily on an empty stomach, waiting 30 minutes before any other medication. The patient verbalized understanding.  - DECREASE Levothyroxine 125 mcg daily.  Given ongoing weight loss  - Trend lab work TSH, FT4 in 3 months and 6 months> follow-up with endocrinology to adjust medication  - If TSH/T4 in normal range, patient should continue refills with their PCP Mana Guerrier MD, given chronic nature of hypothyroidism    - Given thyroid cancer history: TSH goal less than 2.0

## 2025-05-19 NOTE — PROGRESS NOTES
Subjective:      Patient ID: Kwadwo Forrest is a 54 y.o. male presented to [facility] on 5/19/2025.  Chief Complaint:  Hypothyroidism    History of Present Illness: Kwadwo Forrest is a 54 y.o. male here for postsurgical hypothyroidism  No other significant past medical history  Patient moved to area from [place] 1/2023      Interval history:  Patient is here for follow-up hypothyroidism postsurgical.   In 03/2025, patient reportedly taking it doubles dosage of levothyroxine (1x)  on a cruise, leading to poor tempeture control, increase in anxiety.  Had to take lower doses for a few weeks to resolve symptoms  Current in clinic weight 187 <previous 194< 195<< 180.  Reports that he needs to watch his diet.    Does have + Urine blood, seeing Urology now, got CT, Got cystoscopy   Compliant with Levothyroxine 137 mcg daily  On vitamin-D 3 2000 IU daily      1) Postsurgical hypothyroidism  - Report hx of Papillary Thyroid Carcinoma, tall cell variant   - Has not seeing endocrinologist since 7/2021, post operative LOPEZ (dose unclear) around 8/2021  - Saw ENT in [place]  that has been management it  - Unknown thyroid cancer type, did have 1 parathyroid gland removed  - Previously had thyroid nodules, that was seen after MRI of Neck  - Family history thyroid disorder: yes, mom side of the family, 4 members with hypothyroidism    - [facility]  - 11/2022 TSH <0.008, TSH 0.017, levothyroxine dose was decreased to 137 mcg 3/2023  -  LT4 200mcg was too much, leading to symptoms  - Family history of thyroid cancer: yes, maternal uncle with thyroid cancer  - Tobacco user: no    - CURRENT MEDICATION: Levothyroxine 137 mcg daily  - Takes thyroid medication properly without food first thing in the morning, yes    Current symptoms:   No   Yes  []    [x]   Weight gain>> 180-196  []    [x]   Fatigue  [x]    []   Constipation  [x]    []   Hair loss  [x]    []   Brittle nails  [x]    []   Mental fog  []    [x]    Cold intolerance  []    [x]   Anxiety  []    []   Bradycardia    Thyroid lab work  Lab Results   Component Value Date    TSH 0.254 (L) 05/02/2025    TSH 2.490 04/01/2025    TSH 0.083 (L) 11/18/2024    FREET4 1.40 05/02/2025    FREET4 1.31 11/18/2024    FREET4 1.18 05/09/2024    V5UQMYI 82 11/09/2023    F5GOBPG 84 03/13/2023      Lab work reviewed  Lab Results   Component Value Date    HTGN2S <0.1 05/02/2025    THYGLBTUM <0.1 05/09/2024    THYGLBTUM <0.1 03/13/2023    TGAB2S <1.8 05/02/2025      US Neck 02/02/2023  There is no sonographically visualized thyroid parenchyma.   Normal appearing lymph nodes bilaterally.  No suspicious lymph nodes.     Impression:  In this patient with history of papillary thyroid cancer status post radioablation per chart review, no evidence of residual thyroid tissue.  No suspicious lymph nodes.       2) Vit D deficiency  - Noted on lab work  - 05/2024: Recommended OTC vitamin-D 2000 IU daily    Lab Results   Component Value Date    CSMVFLGG33RK 29 (L) 11/18/2024    RUECKEXV82LZ 18 (L) 05/09/2024    VARQPXVV27RP 16 (L) 03/13/2023       Reviewed past surgical, medical, family, social history and updated as appropriate.  Review of Systems: see HPI above    Objective:   /75   Pulse 65   Wt 85 kg (187 lb 6.4 oz)   BMI 24.72 kg/m²   Body mass index is 24.72 kg/m².  Vital signs reviewed    Physical Exam  Vitals and nursing note reviewed.   Constitutional:       Appearance: Normal appearance. He is well-developed. He is not ill-appearing.   Neck:      Thyroid: No thyromegaly.      Comments: Healed thyroidectomy scar  Pulmonary:      Effort: Pulmonary effort is normal. No respiratory distress.   Musculoskeletal:         General: Normal range of motion.      Cervical back: Normal range of motion.   Neurological:      General: No focal deficit present.      Mental Status: He is alert. Mental status is at baseline.   Psychiatric:         Mood and Affect: Mood normal.         Behavior:  Behavior normal.       Lab Reviewed:  See results in subjective  Lab Results   Component Value Date    HGBA1C 5.3 05/02/2025     Lab Results   Component Value Date    CHOL 218 (H) 05/02/2025    HDL 43 05/02/2025    LDLCALC 148.2 05/02/2025    TRIG 134 05/02/2025    CHOLHDL 19.7 (L) 05/02/2025     Lab Results   Component Value Date     04/01/2025    K 3.9 04/01/2025     04/01/2025    CO2 27 04/01/2025    GLU 97 04/01/2025    BUN 14 04/01/2025    CREATININE 0.9 04/01/2025    CALCIUM 8.7 04/01/2025    PHOS 2.4 (L) 05/09/2024    PROT 7.3 04/01/2025    ALBUMIN 4.1 04/01/2025    BILITOT 0.8 04/01/2025    ALKPHOS 63 04/01/2025    AST 20 04/01/2025    ALT 24 04/01/2025    ANIONGAP 10 04/01/2025    TSH 0.254 (L) 05/02/2025    [id number] 29 (L) 11/18/2024     Assessment     1. Postoperative hypothyroidism  TSH    T3    T4, Free    levothyroxine (SYNTHROID) 125 MCG tablet    TSH      2. Vitamin D deficiency  Vitamin D      3. Hx of papillary thyroid carcinoma        4. Gross hematuria          Plan     Postoperative hypothyroidism  - Pt with history of hypothyroidism due to post-surgical   - I reviewed lab work trends: TSH, Free T4, with patient in clinic today 5/19/2025   - Discussed and confirmed the proper way of taking levothyroxine: daily on an empty stomach, waiting 30 minutes before any other medication. The patient verbalized understanding.  - DECREASE Levothyroxine 125 mcg daily.  Given ongoing weight loss  - Trend lab work TSH, FT4 in 3 months and 6 months> follow-up with endocrinology to adjust medication  - If TSH/T4 in normal range, patient should continue refills with their PCP Mana Guerrier MD, given chronic nature of hypothyroidism    - Given thyroid cancer history: TSH goal less than 2.0    Hx of papillary thyroid carcinoma  - Patient reports, unfortunately no pathology for review  - Per patient's diagnosis of Papillary Thyroid carcinoma, Status post LOPEZ 2021  - Ultrasound thyroid bed: 2023:  No sign of remnant tissue.  Lymph nodes are normal on evaluation  - TG is undetectable, reassurance given.  - TSH goals discussed with patient. Goal is low TSH <2  - Monitor TG yearly.  So far 2023, 2024, 2025>>has been negative    Vitamin D deficiency  - advised patient to start vitamin-D 3 OTC 2000 IU daily  - check lab work 2025    Gross hematuria  - positive Urine blood  - seeing Urology now, got CT, Got cystoscopy   - follow up with Urology    Advised patient to follow up with PCP for routine health maintenance care.   RTC in 6 months    Visit today included increased complexity associated with the care of the episodic problem addressed and managing the longitudinal care of the patient due to the serious and/or complex managed problem(s).   Including:  Postsurgical hypothyroidism, history of thyroid cancer, vitamin-D deficiency    Mike Redmond M.D.  Endocrinology  [facility]  5/19/2025      Disclaimer: This note has been generated in part with the use of voice-recognition software. There may be typographical errors that have been missed during proof-reading.

## 2025-06-18 ENCOUNTER — OFFICE VISIT (OUTPATIENT)
Dept: OPTOMETRY | Facility: CLINIC | Age: 55
End: 2025-06-18
Payer: COMMERCIAL

## 2025-06-18 DIAGNOSIS — B02.30 HERPES ZOSTER OPHTHALMICUS OF LEFT EYE: Primary | ICD-10-CM

## 2025-06-18 PROCEDURE — 99214 OFFICE O/P EST MOD 30 MIN: CPT | Mod: S$GLB,,, | Performed by: OPTOMETRIST

## 2025-06-18 PROCEDURE — 99999 PR PBB SHADOW E&M-EST. PATIENT-LVL II: CPT | Mod: PBBFAC,,, | Performed by: OPTOMETRIST

## 2025-06-18 PROCEDURE — 1159F MED LIST DOCD IN RCRD: CPT | Mod: CPTII,S$GLB,, | Performed by: OPTOMETRIST

## 2025-06-18 PROCEDURE — 3044F HG A1C LEVEL LT 7.0%: CPT | Mod: CPTII,S$GLB,, | Performed by: OPTOMETRIST

## 2025-06-18 RX ORDER — VALACYCLOVIR HYDROCHLORIDE 1 G/1
1000 TABLET, FILM COATED ORAL 3 TIMES DAILY
Qty: 30 TABLET | Refills: 0 | Status: SHIPPED | OUTPATIENT
Start: 2025-06-18 | End: 2025-06-28

## 2025-06-18 NOTE — PROGRESS NOTES
HPI    Patient is here today due to having shingles outbreak under OS LL. Patient   states that he began to feel discomfort Sunday OS. Had a virtual UC visit   this morning and was referred to see optometry immediately. [facility] more   blurred OS since outbreak. Slight swelling OS.   No gtts   Last edited by Sita Saavedra, OD on 6/18/2025  2:55 PM.            Assessment /Plan     For exam results, see Encounter Report.    Herpes zoster ophthalmicus of left eye  -     valACYclovir (VALTREX) 1000 MG tablet; Take 1 tablet (1,000 mg total) by mouth 3 (three) times daily. for 10 days  Dispense: 30 tablet; Refill: 0         Educated pt on today's findings: no K involvement at this time. Pt to begin/complete Valtrex course as prescribed and begin Eryhtromycin oma application on scabbing lesions.     RTC x 3-4 weeks for annual DFE or ASAP if symptoms persist, worsen, or new symptoms arise

## 2025-07-14 ENCOUNTER — OFFICE VISIT (OUTPATIENT)
Dept: UROLOGY | Facility: CLINIC | Age: 55
End: 2025-07-14
Payer: COMMERCIAL

## 2025-07-14 VITALS
DIASTOLIC BLOOD PRESSURE: 77 MMHG | SYSTOLIC BLOOD PRESSURE: 114 MMHG | WEIGHT: 180.69 LBS | HEART RATE: 67 BPM | BODY MASS INDEX: 23.84 KG/M2

## 2025-07-14 DIAGNOSIS — R39.12 BENIGN PROSTATIC HYPERPLASIA WITH WEAK URINARY STREAM: Primary | ICD-10-CM

## 2025-07-14 DIAGNOSIS — R31.9 HEMATURIA, UNSPECIFIED TYPE: ICD-10-CM

## 2025-07-14 DIAGNOSIS — N40.1 BENIGN PROSTATIC HYPERPLASIA WITH WEAK URINARY STREAM: Primary | ICD-10-CM

## 2025-07-14 LAB
BACTERIA #/AREA URNS AUTO: ABNORMAL /HPF
BILIRUBIN, UA POC OHS: NEGATIVE
BLOOD, UA POC OHS: ABNORMAL
CLARITY, UA POC OHS: CLEAR
COLOR, UA POC OHS: YELLOW
GLUCOSE, UA POC OHS: NEGATIVE
KETONES, UA POC OHS: NEGATIVE
LEUKOCYTES, UA POC OHS: NEGATIVE
MICROSCOPIC COMMENT: ABNORMAL
NITRITE, UA POC OHS: NEGATIVE
PH, UA POC OHS: 6
POC RESIDUAL URINE VOLUME: 12 ML (ref 0–100)
PROTEIN, UA POC OHS: 30
RBC #/AREA URNS AUTO: >100 /HPF (ref 0–4)
SPECIFIC GRAVITY, UA POC OHS: >=1.03
UROBILINOGEN, UA POC OHS: 0.2
WBC #/AREA URNS AUTO: 1 /HPF (ref 0–5)

## 2025-07-14 PROCEDURE — 3008F BODY MASS INDEX DOCD: CPT | Mod: CPTII,S$GLB,,

## 2025-07-14 PROCEDURE — 99214 OFFICE O/P EST MOD 30 MIN: CPT | Mod: S$GLB,,,

## 2025-07-14 PROCEDURE — 81001 URINALYSIS AUTO W/SCOPE: CPT

## 2025-07-14 PROCEDURE — 81003 URINALYSIS AUTO W/O SCOPE: CPT | Mod: QW,S$GLB,,

## 2025-07-14 PROCEDURE — 3074F SYST BP LT 130 MM HG: CPT | Mod: CPTII,S$GLB,,

## 2025-07-14 PROCEDURE — 3044F HG A1C LEVEL LT 7.0%: CPT | Mod: CPTII,S$GLB,,

## 2025-07-14 PROCEDURE — 1159F MED LIST DOCD IN RCRD: CPT | Mod: CPTII,S$GLB,,

## 2025-07-14 PROCEDURE — 88112 CYTOPATH CELL ENHANCE TECH: CPT | Mod: TC

## 2025-07-14 PROCEDURE — 51798 US URINE CAPACITY MEASURE: CPT | Mod: S$GLB,,,

## 2025-07-14 PROCEDURE — 3078F DIAST BP <80 MM HG: CPT | Mod: CPTII,S$GLB,,

## 2025-07-14 PROCEDURE — 99999 PR PBB SHADOW E&M-EST. PATIENT-LVL III: CPT | Mod: PBBFAC,,,

## 2025-07-14 PROCEDURE — 1160F RVW MEDS BY RX/DR IN RCRD: CPT | Mod: CPTII,S$GLB,,

## 2025-07-14 NOTE — PROGRESS NOTES
[facility]  Urology Clinic Note    Date of Service: 07/14/2025     CHIEF COMPLAINT: Hematuria Follow up    History of Present Illness:   Kwadwo Forrest is a 54 y.o. male who presents to today for 6 month hematuria follow up. He is established to our clinic but new to me.    He was last seen by Dr. Carey for a hematuria work up.   Relates it to strenuous activity. No recent UTIs.   No personal or family history of urologic cancers.   Endorses any previous smoking history. Smoked for 10 pack years. Quit approximately 5 years ago.   Endorses a previous history of hematuria.      He reports he had gross hematuria this past Friday and Saturday.   Episode of shingles in June.   Recent problem with temperature regulation - thyroid removed, on synthroid. Recent dose adjustment.     He reports his stream is slow to start. Most of the time he does not feel like he is emptying his bladder completely.   No nocturia.   Last PSA 0.52 4/2025.     Cystoscopy on 4/28  1. Normal anterior urethra without evidence of strictures or tumors   2. Prostatic urethra open without signs of obstruction  3. Bladder free of masses, tumors, lesions.  Ureteral orifices in orthotopic position bilaterally       Review of Symptoms:  Review of Systems   Constitutional:  Negative for chills and fever.   Respiratory:  Negative for shortness of breath and wheezing.    Cardiovascular:  Negative for chest pain.   Gastrointestinal:  Negative for abdominal pain, constipation, diarrhea, nausea and vomiting.   Genitourinary:  Positive for hematuria. Negative for dysuria, flank pain, frequency and urgency.        Incomplete bladder emptying, Weak/slow FOS     Patient History:  Past Medical History:   Diagnosis Date    Anxiety disorder with panic attacks 04/15/2025    History of TB (tuberculosis) 04/15/2025    Hx of papillary thyroid carcinoma 01/30/2023    Postoperative hypothyroidism 01/30/2023     Past Surgical History:   Procedure Laterality Date     APPENDECTOMY  2011    COLONOSCOPY, SCREENING, LOW RISK PATIENT N/A 5/12/2025    Procedure: COLONOSCOPY, SCREENING, LOW RISK PATIENT;  Surgeon: Yeison Antoine MD;  Location: [facility] ([address] [place] [address]);  Service: Gastroenterology;  Laterality: N/A;  Dr. Mana Guerrier, peg, portal, ASam  5/2 pre-call complete. SS    HERNIA REPAIR  2004    THYROIDECTOMY  2021     Family History   Problem Relation Name Age of Onset    Cancer Father Star     COPD Maternal Aunt Pinky     Cancer Maternal Uncle Miki 49        colon cancer     Social History[1]  Allergies:  Patient has no known allergies.  Medications:  Current Medications[2]    OBJECTIVE:     Vitals:    07/14/25 1328   BP: 114/77   BP Location: Left arm   Patient Position: Sitting   Pulse: 67   Weight: 81.9 kg (180 lb 10.7 oz)      Physical Exam  Constitutional:       Appearance: Normal appearance.   HENT:      Head: Normocephalic.   Pulmonary:      Effort: Pulmonary effort is normal. No respiratory distress.      Breath sounds: No wheezing.   Abdominal:      General: There is no distension.      Tenderness: There is no abdominal tenderness.   Neurological:      Mental Status: He is alert.   Psychiatric:         Mood and Affect: Mood normal.     LAB:      All laboratory values listed below was/were independently reviewed with patient at this clinic visit.    In office UA today was negative leukocytes and nitrites, large blood.     PVR done in office today immediately after urination by the nurse. PVR is 12 ml.     Lab Results   Component Value Date    BUN 14 04/01/2025    CREATININE 0.9 04/01/2025    WBC 5.98 04/01/2025    HGB 14.8 04/01/2025    HCT 46.4 04/01/2025     04/01/2025    AST 20 04/01/2025    ALT 24 04/01/2025    ALKPHOS 63 04/01/2025    ALBUMIN 4.1 04/01/2025    HGBA1C 5.3 05/02/2025     Lab Results   Component Value Date    PSA 0.52 04/16/2025     Lab Results   Component Value Date    CREATININE 0.9 04/01/2025    EGFRNORACEVR >60 04/01/2025      IMAGING:    All imaging listed below was/were independently reviewed with patient at this clinic visit.  CT Urogram 4/23/25   Renal/Ureters: Normal size with bilateral symmetric enhancement.  No nephrolithiasis or hydroureteronephrosis.  No solid renal mass lesion.  No focal ureteral abnormality.  No ureteral or bladder stone.  Dependent layering excreted contrast material in the urinary bladder with curvilinear hypodensity extending along the posterior aspect of the bladder lumen (axial series 4, image 154).  No significant perivesicular soft tissue stranding.  Reproductive: Prostate is mildly enlarged measuring 4.9 cm transverse dimension with minimal mass effect on the bladder base.  Impression:  Small dependent curvilinear filling defect within the bladder could potentially represent debris, clot with urothelial neoplasm not excluded.  Consider further evaluation with cystoscopy.  No solid renal mass lesion or focal ureteral abnormality.  No nephrolithiasis or hydronephrosis.    IMPRESSION:  Encounter Diagnoses   Name Primary?    Benign prostatic hyperplasia with weak urinary stream Yes    Hematuria, unspecified type      ASSESSMENT/PLAN:     Plan: I spent a total of 30 minutes on the day of the visit. This includes face to face time and non-face to face time preparing to see the patient (eg, review of tests), obtaining and/or reviewing separately obtained history, documenting clinical information in the electronic or other health record, independently interpreting results and communicating results to the patient/family/caregiver, or care coordinator.  Reviewed the possible contributory factors.  Reviewed possible management if needed.  Recommendations for PCP follow up visit; any need to go to the ER.    Recommended lifestyle modifications with a proper, healthy diet, good hydration but during the day.  Benefits of regular exercise.     Reassurance with today's in office UA.  Emptying bladder well.   We  discussed Flomax to improve stream. Will hold off at this time.   Sending urine for cytology and Micro UA.   Follow up pending results.       DEE Johansen         [1]   Social History  Socioeconomic History    Marital status:    Occupational History    Occupation:  for non-profit   Tobacco Use    Smoking status: Former     Current packs/day: 0.00     Average packs/day: 0.5 packs/day for 15.0 years (7.5 ttl pk-yrs)     Types: Cigarettes     Quit date:      Years since quittin.5    Smokeless tobacco: Never   Substance and Sexual Activity    Alcohol use: Yes     Alcohol/week: 6.0 standard drinks of alcohol     Types: 6 Cans of beer per week    Drug use: Yes     Frequency: 7.0 times per week     Types: Marijuana    Sexual activity: Yes     Partners: Female     Birth control/protection: See Surgical Hx     Social Drivers of Health     Financial Resource Strain: Low Risk  (3/28/2025)    Overall Financial Resource Strain (CARDIA)     Difficulty of Paying Living Expenses: Not hard at all   Food Insecurity: No Food Insecurity (3/28/2025)    Hunger Vital Sign     Worried About Running Out of Food in the Last Year: Never true     Ran Out of Food in the Last Year: Never true   Transportation Needs: No Transportation Needs (3/28/2025)    PRAPARE - Transportation     Lack of Transportation (Medical): No     Lack of Transportation (Non-Medical): No   Physical Activity: Sufficiently Active (3/28/2025)    Exercise Vital Sign     Days of Exercise per Week: 5 days     Minutes of Exercise per Session: 40 min   Stress: Stress Concern Present (3/28/2025)    [employer] - Occupational Stress Questionnaire     Feeling of Stress : To some extent   Housing Stability: Low Risk  (3/28/2025)    Housing Stability Vital Sign     Unable to Pay for Housing in the Last Year: No     Number of Times Moved in the Last Year: 0     Homeless in the Last Year: No   [2]   Current Outpatient  Medications:     levothyroxine (SYNTHROID) 125 MCG tablet, Take 1 tablet (125 mcg total) by mouth before breakfast., Disp: 90 tablet, Rfl: 3    LORazepam (ATIVAN) 0.5 MG tablet, Take 1 tablet (0.5 mg total) by mouth 2 (two) times daily as needed for Anxiety., Disp: 10 tablet, Rfl: 0    valACYclovir (VALTREX) 1000 MG tablet, Take 1 tablet (1,000 mg total) by mouth 3 (three) times daily. for 10 days, Disp: 30 tablet, Rfl: 0

## 2025-07-17 ENCOUNTER — TELEPHONE (OUTPATIENT)
Dept: UROLOGY | Facility: CLINIC | Age: 55
End: 2025-07-17
Payer: COMMERCIAL

## 2025-07-17 ENCOUNTER — PATIENT MESSAGE (OUTPATIENT)
Dept: UROLOGY | Facility: CLINIC | Age: 55
End: 2025-07-17
Payer: COMMERCIAL

## 2025-07-17 DIAGNOSIS — R31.9 HEMATURIA, UNSPECIFIED TYPE: ICD-10-CM

## 2025-07-17 DIAGNOSIS — N40.1 BENIGN PROSTATIC HYPERPLASIA WITH WEAK URINARY STREAM: Primary | ICD-10-CM

## 2025-07-17 DIAGNOSIS — R39.12 BENIGN PROSTATIC HYPERPLASIA WITH WEAK URINARY STREAM: Primary | ICD-10-CM

## 2025-07-17 NOTE — TELEPHONE ENCOUNTER
----- Message from Sánchez Carey MD sent at 7/17/2025 12:57 PM CDT -----  Thanks Tania,     I would send him to Dr. Walker for consideration of TURP if he's having recurrent gross hematuria if it's bothering him. Alternatively can observe or start finasteride.    Sánchez  ----- Message -----  From: Tania Mendoza FNP  Sent: 7/17/2025  10:04 AM CDT  To: Sánchez Carey MD    I saw this patient of yours for a hematuria follow up.   CT Urogram and Cysto normal back in April.   He had episodes of gross hematuria this past weekend.     At his visit we discussed his enlarged prostate and that bleeding could be coming from prostate.     No infection on UA, Cytology shows reactive urothelial cells. Micro UA > 100 RBC.   Just wanted to make you aware and see what your thoughts were.  Thanks!   ----- Message -----  From: Lisa Neri RN  Sent: 7/14/2025   1:39 PM CDT  To: ABRAHAM Martinez

## 2025-07-17 NOTE — TELEPHONE ENCOUNTER
Unable to reach him by phone today.  I will be happy to see him to discuss his recurrent hematuria.  I agree with Dr. Carey that he may be benefited by trying finasteride or possible TURP for recurrent hematuria.  I will be happy to see him in person in clinic.

## 2025-08-19 ENCOUNTER — LAB VISIT (OUTPATIENT)
Dept: LAB | Facility: HOSPITAL | Age: 55
End: 2025-08-19
Attending: HOSPITALIST
Payer: COMMERCIAL

## 2025-08-19 DIAGNOSIS — E89.0 POSTOPERATIVE HYPOTHYROIDISM: ICD-10-CM

## 2025-08-19 LAB — TSH SERPL-ACNC: 3.38 UIU/ML (ref 0.4–4)

## 2025-08-19 PROCEDURE — 36415 COLL VENOUS BLD VENIPUNCTURE: CPT

## 2025-08-19 PROCEDURE — 84443 ASSAY THYROID STIM HORMONE: CPT
